# Patient Record
Sex: MALE | Race: WHITE | URBAN - METROPOLITAN AREA
[De-identification: names, ages, dates, MRNs, and addresses within clinical notes are randomized per-mention and may not be internally consistent; named-entity substitution may affect disease eponyms.]

---

## 2019-03-29 ENCOUNTER — HOSPITAL ENCOUNTER (OUTPATIENT)
Dept: OTHER | Facility: HOSPITAL | Age: 59
Discharge: HOME | End: 2019-03-29
Attending: FAMILY MEDICINE

## 2019-03-29 ENCOUNTER — OFFICE VISIT CONVERTED (OUTPATIENT)
Dept: FAMILY MEDICINE CLINIC | Age: 59
End: 2019-03-29
Attending: FAMILY MEDICINE

## 2019-03-29 LAB
ALBUMIN SERPL-MCNC: 3.5 G/DL (ref 3.5–5)
ALBUMIN/GLOB SERPL: 1 {RATIO} (ref 1.4–2.6)
ALT SERPL-CCNC: 12 U/L (ref 10–40)
ANION GAP SERPL CALC-SCNC: 15 MMOL/L (ref 8–19)
AST SERPL-CCNC: 12 U/L (ref 15–50)
BASOPHILS # BLD AUTO: 0.05 10*3/UL (ref 0–0.2)
BASOPHILS NFR BLD AUTO: 0.7 % (ref 0–3)
BILIRUB SERPL-MCNC: 0.28 MG/DL (ref 0.2–1.3)
BUN SERPL-MCNC: 12 MG/DL (ref 5–25)
BUN/CREAT SERPL: 12 {RATIO} (ref 6–20)
CHLORIDE SERPL-SCNC: 104 MMOL/L (ref 99–111)
CHOLEST SERPL-MCNC: 160 MG/DL (ref 107–200)
CHOLEST/HDLC SERPL: 5.9 {RATIO} (ref 3–6)
CONV ABS IMM GRAN: 0.02 10*3/UL (ref 0–0.2)
CONV CO2: 25 MMOL/L (ref 22–32)
CONV IMMATURE GRAN: 0.3 % (ref 0–1.8)
CONV TOTAL PROTEIN: 7 G/DL (ref 6.3–8.2)
CREAT UR-MCNC: 1 MG/DL (ref 0.7–1.2)
DEPRECATED RDW RBC AUTO: 41.8 FL (ref 35.1–43.9)
EOSINOPHIL # BLD AUTO: 0.16 10*3/UL (ref 0–0.7)
EOSINOPHIL # BLD AUTO: 2.3 % (ref 0–7)
ERYTHROCYTE [DISTWIDTH] IN BLOOD BY AUTOMATED COUNT: 12.8 % (ref 11.6–14.4)
GFR SERPLBLD BASED ON 1.73 SQ M-ARVRAT: >60 ML/MIN/{1.73_M2}
GLOBULIN UR ELPH-MCNC: 3.5 G/DL (ref 2–3.5)
GLUCOSE SERPL-MCNC: 117 MG/DL (ref 70–99)
HBA1C MFR BLD: 14.7 G/DL (ref 14–18)
HCT VFR BLD AUTO: 44.6 % (ref 42–52)
HDLC SERPL-MCNC: 27 MG/DL (ref 40–60)
LDLC SERPL CALC-MCNC: 85 MG/DL (ref 70–100)
LYMPHOCYTES # BLD AUTO: 0.81 10*3/UL (ref 1–5)
MCH RBC QN AUTO: 29.6 PG (ref 27–31)
MCHC RBC AUTO-ENTMCNC: 33 G/DL (ref 33–37)
MCV RBC AUTO: 89.7 FL (ref 80–96)
MONOCYTES # BLD AUTO: 0.45 10*3/UL (ref 0.2–1.2)
MONOCYTES NFR BLD AUTO: 6.4 % (ref 3–10)
NEUTROPHILS # BLD AUTO: 5.58 10*3/UL (ref 2–8)
NEUTROPHILS NFR BLD AUTO: 78.8 % (ref 30–85)
NRBC CBCN: 0 % (ref 0–0.7)
OSMOLALITY SERPL CALC.SUM OF ELEC: 291 MOSM/KG (ref 273–304)
PLATELET # BLD AUTO: 272 10*3/UL (ref 130–400)
PMV BLD AUTO: 9.1 FL (ref 9.4–12.4)
POTASSIUM SERPL-SCNC: 3.8 MMOL/L (ref 3.5–5.3)
RBC # BLD AUTO: 4.97 10*6/UL (ref 4.7–6.1)
SODIUM SERPL-SCNC: 140 MMOL/L (ref 135–147)
TRIGL SERPL-MCNC: 241 MG/DL (ref 40–150)
TSH SERPL-ACNC: 0.61 M[IU]/L (ref 0.27–4.2)
VARIANT LYMPHS NFR BLD MANUAL: 11.5 % (ref 20–45)
VLDLC SERPL-MCNC: 48 MG/DL (ref 5–37)
WBC # BLD AUTO: 7.07 10*3/UL (ref 4.8–10.8)

## 2019-04-01 LAB
ALP SERPL-CCNC: 89 U/L (ref 56–119)
ASO AB SERPL-ACNC: 20 [IU]/ML (ref 0–200)
CALCIUM SERPL-MCNC: 9 MG/DL (ref 8.7–10.4)
CONV ANTI NUCLEAR ANTIBODY WITH REFLEX: NEGATIVE
CONV RHEUMATOID FACTOR IGM: 10.6 [IU]/ML (ref 0–14)
CRP SERPL-MCNC: 26.1 MG/L (ref 0–5)
ERYTHROCYTE [SEDIMENTATION RATE] IN BLOOD: 13 MM/H (ref 0–20)
PHOSPHATE SERPL-MCNC: 3.2 MG/DL (ref 2.4–4.5)
URATE SERPL-MCNC: 6.6 MG/DL (ref 3.5–8.5)

## 2019-04-05 ENCOUNTER — OFFICE VISIT CONVERTED (OUTPATIENT)
Dept: FAMILY MEDICINE CLINIC | Age: 59
End: 2019-04-05
Attending: FAMILY MEDICINE

## 2019-06-07 ENCOUNTER — OFFICE VISIT CONVERTED (OUTPATIENT)
Dept: FAMILY MEDICINE CLINIC | Age: 59
End: 2019-06-07
Attending: FAMILY MEDICINE

## 2019-11-22 ENCOUNTER — OFFICE VISIT CONVERTED (OUTPATIENT)
Dept: FAMILY MEDICINE CLINIC | Age: 59
End: 2019-11-22
Attending: FAMILY MEDICINE

## 2020-01-24 ENCOUNTER — OFFICE VISIT CONVERTED (OUTPATIENT)
Dept: NEUROSURGERY | Facility: CLINIC | Age: 60
End: 2020-01-24
Attending: PHYSICIAN ASSISTANT

## 2021-05-15 VITALS
HEIGHT: 72 IN | WEIGHT: 205.12 LBS | DIASTOLIC BLOOD PRESSURE: 80 MMHG | SYSTOLIC BLOOD PRESSURE: 133 MMHG | BODY MASS INDEX: 27.78 KG/M2

## 2021-05-18 NOTE — PROGRESS NOTES
Tyrone Painting 1960     Office/Outpatient Visit    Visit Date: Fri, Mar 29, 2019 04:17 pm    Provider: Jas Avina MD (Assistant: Chioma De Paz MA)    Location: [facility]        Electronically signed by Jas Avina MD on  03/29/2019 05:59:43 PM                             SUBJECTIVE:        CC:     Mr. Painting is a 59 year old male.  establish care; joint pain; sciatica         HPI: Does not like going to the doctor. No primary doctor since Sl[facility] Riley.         PHQ-9 Depression Screening: Completed form scanned and in chart; Total Score 14 Alcohol Consumption Screening: Completed form scanned and in chart; Total Score 1     Pt reports chronic low back pain. This limits him at work and hobbies.     BP today is elevated. Pt denies h/o HTN but does not go to the doctor much.     Spastic colon and irritable bowel syndrome. First colonoscopy was in his 30s. Last colonoscopy was about 5 years ago at [facility].     PHQ-9 score of 14 is positive for depression. He denies being frankly depressed but also says he doesn't really care if he is. His depression centers around his chronic pain, which prevents him from doing things like working on cars.     Pt reports diffuse muscle aches every where. If he sleeps for more than 5 hours he wakes up sore and stiff in all his joints.     ROS:     CONSTITUTIONAL:  Negative for fatigue and fever.      EYES:  Negative for blurred vision.      E/N/T:  Negative for diminished hearing and nasal congestion.      CARDIOVASCULAR:  Negative for chest pain and palpitations.      RESPIRATORY:  Negative for recent cough and dyspnea.      GASTROINTESTINAL:  Negative for abdominal pain, constipation, diarrhea, nausea and vomiting.      MUSCULOSKELETAL:  Positive for arthralgias, back pain and myalgias.      INTEGUMENTARY:  Negative for atypical mole(s) and rash.      NEUROLOGICAL:  Negative for paresthesias and weakness.      PSYCHIATRIC:  Positive for depression,  anhedonia and low motivation.   Negative for anxiety or sleep disturbance.          PMH/FMH/SH:     Last Reviewed on 3/29/2019 05:50 PM by Jas Avina    Past Medical History:             PAST MEDICAL HISTORY         Osteoarthritis     Irritable Bowel Syndrome     Chronic Pain     Depression         PREVENTIVE HEALTH MAINTENANCE             COLORECTAL CANCER SCREENING:; colonoscopy with normal results; 4-5 years ago - [facility]         Surgical History:         Hernia Repair: right inguinal;         Family History:     Father:  at age 91; Cause of death was colon cancer     Mother: Hypertension; Hyperlipidemia;  Type 1 Diabetes         Social History:     Occupation: [employer].   [employer]     Marital Status:      Children: 2 children         Tobacco/Alcohol/Supplements:     Last Reviewed on 3/29/2019 05:50 PM by Jas Avina    Tobacco: Current Smoker: He currently smokes every day, 1 pack per day.  Non-drinker     Caffeine:  He admits to consuming caffeine via soda ( -Mt Dew ).          Substance Abuse History:     Last Reviewed on 3/29/2019 05:50 PM by Jas Avina    None         Mental Health History:     Last Reviewed on 3/29/2019 05:50 PM by Jas Avina        Major Depression         Communicable Diseases (eg STDs):     Last Reviewed on 3/29/2019 05:50 PM by Jas Avina            Current Problems:     Last Reviewed on 3/29/2019 05:50 PM by Jas Avina    Diffuse arthralgia     Major depression, recurrent episode, moderate     Spastic colon     Chronic low back pain     Low back pain     Elevated blood pressure without a diagnosis of hypertension     Screening for depression         Immunizations:     None        Allergies:     Last Reviewed on 3/29/2019 05:50 PM by Jas Avina      No Known Drug Allergies.         Current Medications:     Last Reviewed on 3/29/2019 05:50 PM by Jas Avina    None        OBJECTIVE:        Vitals:         Current: 3/29/2019  4:25:22 PM    Ht:  5 ft, 11.5 in;  Wt: 195.6 lbs;  BMI: 26.9    T: 98.5 F (oral);  BP: 135/83 mm Hg (left arm, sitting);  P: 89 bpm (left arm (BP Cuff), sitting)        Exams:     PHYSICAL EXAM:     GENERAL: Vitals recorded well developed, well nourished;  well groomed;  no apparent distress;     EYES: extraocular movements intact; conjunctiva and cornea are normal; PERRLA;     E/N/T: OROPHARYNX:  normal mucosa, dentition, gingiva, and posterior pharynx;     RESPIRATORY: normal respiratory rate and pattern with no distress; normal breath sounds with no rales, rhonchi, wheezes or rubs;     CARDIOVASCULAR: normal rate; rhythm is regular;  no systolic murmur; no edema;     GASTROINTESTINAL: nontender; normal bowel sounds;     MUSCULOSKELETAL: normal gait; normal overall tone     NEUROLOGIC: mental status: alert and oriented x 3;     PSYCHIATRIC:  appropriate affect and demeanor; normal speech pattern; grossly normal memory;         ASSESSMENT           V79.0   Z13.89  Screening for depression              DDx:     724.2   F41.8  Chronic low back pain              DDx:     796.2   R03.0  Elevated blood pressure without a diagnosis of hypertension              DDx:     564.1   K58.9  Spastic colon              DDx:     296.32   F33.1  Major depression, recurrent episode, moderate              DDx:     719.49   M15.0  Diffuse arthralgia              DDx:         ORDERS:         Meds Prescribed:       Amitriptyline HCl 25mg Tablet 1 po QHS  #30 (Thirty) tablet(s) Refills: 1         Radiology/Test Orders:       [id number]  Radiologic examination, spine, lumbosacral;  minimum of four views  (Send-Out)           Lab Orders:       [id number]  [facility] PHYSICAL: CMP, CBC, TSH, LIPID: [id number], [id number], [id number], [id number]  (Send-Out)         37465  [facility] Arthritis Profile  (Send-Out)           Other Orders:         Depression screen positive and follow up plan documented  (In-House)         1101F  Pt screen for fall risk; document no  falls in past year or only 1 fall w/o injury in past year (ANABELLA)  (In-House)           Negative EtOH screen  (In-House)                   PLAN:          Screening for depression     MIPS PHQ-9 Depression Screening Completed form scanned and in chart; Total Score 14 Positive Depression Screen: Suicide Risk Assessment completed--denies suicidal/homicidal ideation; Pharmacologic intervention initiated/modified Negative alcohol screen           Orders:         Depression screen positive and follow up plan documented  (In-House)         1101F  Pt screen for fall risk; document no falls in past year or only 1 fall w/o injury in past year (ANABELLA)  (In-House)           Negative EtOH screen  (In-House)            Chronic low back pain x-ray ordered to assess chronic low back pain.         RADIOLOGY:  I have ordered Lumbar/Sacral Spine X-ray to be done today.            Orders:       [id number]  Radiologic examination, spine, lumbosacral;  minimum of four views  (Send-Out)            Elevated blood pressure without a diagnosis of hypertension Consider metoprolol.     LABORATORY:  Labs ordered to be performed today include PHYSICAL PANEL; CMP, CBC, TSH, LIPID.            Orders:       [id number]  [facility] PHYSICAL: CMP, CBC, TSH, LIPID: [id number], [id number], [id number], [id number]  (Send-Out)            Spastic colon Pt may very well have IBS, consider linzess, bentyl, hyociomine, or amitiza. Exercise and fiber as well.          Major depression, recurrent episode, moderate Pt meets criteria for depression. This seems to stem from his chronic pain consisting of back pain and diffuse arthralgia. We considered sertraline for depression but decided on amitryptiline which may help with potential fibromyalgia picture, sleep, and depression.           Prescriptions:       Amitriptyline HCl 25mg Tablet 1 po QHS  #30 (Thirty) tablet(s) Refills: 1          Diffuse arthralgia Arthritis panel ordered to asses for potential autimmune conditions such  as RA.     LABORATORY:  Labs ordered to be performed today include Arthritis Profile.            Orders:       [id number]  [facility] Arthritis Profile  (Send-Out)               Patient Recommendations:        For  Diffuse arthralgia:     I also recommend ^.              CHARGE CAPTURE           **Please note: ICD descriptions below are intended for billing purposes only and may not represent clinical diagnoses**        Primary Diagnosis:         V79.0 Screening for depression            Z13.89    Encounter for screening for other disorder              Orders:          40030   Office visit - new pt, level 3  (In-House)                Depression screen positive and follow up plan documented  (In-House)             1101F   Pt screen for fall risk; document no falls in past year or only 1 fall w/o injury in past year (ANABELLA)  (In-House)                Negative EtOH screen  (In-House)           724.2 Chronic low back pain            F41.8    Other specified anxiety disorders    796.2 Elevated blood pressure without a diagnosis of hypertension            R03.0    Elevated blood-pressure reading, without diagnosis of hypertension    564.1 Spastic colon            K58.9    Irritable bowel syndrome without diarrhea    296.32 Major depression, recurrent episode, moderate            F33.1    Major depressive disorder, recurrent, moderate    719.49 Diffuse arthralgia            M15.0    Primary generalized (osteo)arthritis

## 2021-05-18 NOTE — PROGRESS NOTES
Tyrone Painting 1960     Office/Outpatient Visit    Visit Date: Fri, Jun 7, 2019 04:47 pm    Provider: Jas Avina MD (Assistant: Abhinav Barnett)    Location: [facility]        Electronically signed by Jas Avina MD on  06/08/2019 08:42:14 AM                             SUBJECTIVE:        CC:     Mr. Painting is a 59 year old male.  This is a follow-up visit.          HPI:     Pt reports he continues to have joint and muscle aches involving almost his whole body. When asked to specify which joints and/or muscles hurt the most he has trouble identifying which but says his lower back, shoulders, knees, thighs, biceps, etc all ache. He also reports not having near as much energy or stamina as he once did. No improvement with amitriptyline or flexeril.     BP is normal today. I prescribed him lisinopril 10 mg  qd at last visit 2 months ago. He reports he has not been taking this medicine although BP is normal today. He checked his BP at work and it was normal then.     Left knee swelling for the last month. He reports its painful, worse with squatting, worse with crossing his legs, going up and down steps.     Pt reports he feels pretty much the same today as he did at his previous visits. He denies ino depression, reports just feeling more down and fatigued than anything. No improvement with amitriptyline.     ROS:     CONSTITUTIONAL:  Positive for fatigue.   Negative for fever.      E/N/T:  Negative for diminished hearing and nasal congestion.      CARDIOVASCULAR:  Negative for chest pain and palpitations.      RESPIRATORY:  Negative for recent cough and dyspnea.      GASTROINTESTINAL:  Negative for abdominal pain, constipation, diarrhea, nausea and vomiting.      MUSCULOSKELETAL:  Positive for arthralgias, back pain and myalgias.      INTEGUMENTARY:  Negative for atypical mole(s) and rash.      NEUROLOGICAL:  Negative for paresthesias and weakness.      PSYCHIATRIC:  Positive for  anhedonia, insomnia and low motivation.   Negative for anxiety, depression or sadness.          PMH/FMH/SH:     Last Reviewed on 2019 08:27 AM by Jas Avina    Past Medical History:             PAST MEDICAL HISTORY         Osteoarthritis     Irritable Bowel Syndrome     Chronic Pain     Depression         PREVENTIVE HEALTH MAINTENANCE             COLORECTAL CANCER SCREENING:; colonoscopy with normal results; 4-5 years ago - [facility]         Surgical History:         Hernia Repair: right inguinal;         Family History:     Father:  at age 91; Cause of death was colon cancer     Mother: Hypertension; Hyperlipidemia;  Type 1 Diabetes         Social History:     Occupation: [employer].   [employer]     Marital Status:      Children: 2 children         Tobacco/Alcohol/Supplements:     Last Reviewed on 2019 08:27 AM by Jas Avina    Tobacco: Current Smoker: He currently smokes every day, 1 pack per day.  Non-drinker     Caffeine:  He admits to consuming caffeine via soda ( -Mt Dew ).          Substance Abuse History:     Last Reviewed on 2019 08:27 AM by Jas Avina    None         Mental Health History:     Last Reviewed on 2019 08:27 AM by Jas Avina        Major Depression         Communicable Diseases (eg STDs):     Last Reviewed on 2019 08:27 AM by Jas Avina            Current Problems:     Last Reviewed on 2019 08:27 AM by Jas Avina    Benign HTN     Diffuse arthralgia     Major depression, recurrent episode, moderate     Spastic colon     Chronic low back pain     Low back pain     Knee swelling     Screening for depression         Immunizations:     None        Allergies:     Last Reviewed on 2019 08:27 AM by Jas Avina      No Known Drug Allergies.         Current Medications:     Last Reviewed on 2019 08:27 AM by Jas Avina    Lisinopril 10mg Tablet 1 tab daily         OBJECTIVE:        Vitals:         Current:  6/7/2019 4:51:57 PM    Ht:  5 ft, 11.5 in;  Wt: 195.6 lbs;  BMI: 26.9    T: 98.4 F (oral);  BP: 129/78 mm Hg (left arm, sitting);  P: 73 bpm (left arm (BP Cuff), sitting);  sCr: 1 mg/dL;  GFR: 81.08        Exams:     PHYSICAL EXAM:     GENERAL: Vitals recorded well developed, well nourished;  well groomed;  no apparent distress, tired-appearing;     EYES: extraocular movements intact; conjunctiva and cornea are normal; PERRLA;     E/N/T: OROPHARYNX:  normal mucosa, dentition, gingiva, and posterior pharynx;     RESPIRATORY: normal respiratory rate and pattern with no distress; normal breath sounds with no rales, rhonchi, wheezes or rubs;     CARDIOVASCULAR: normal rate; rhythm is regular;  no systolic murmur; no edema;     GASTROINTESTINAL: nontender; normal bowel sounds;     MUSCULOSKELETAL: normal gait; normal overall tone     NEUROLOGIC: mental status: alert and oriented x 3;     PSYCHIATRIC: affect/demeanor: depressed, flat, slightly less flat affect than on previous visits;  normal psychomotor function; normal speech pattern; normal thought and perception;         ASSESSMENT           719.49   M15.0  Diffuse arthralgia              DDx:     401.1   I10  Benign HTN              DDx:     719.06   M25.462  Knee swelling              DDx:     296.32   F33.1  Major depression, recurrent episode, moderate              DDx:         ORDERS:         Meds Prescribed:       Sertraline HCl 50mg Tablet 1/2 tab for 2 weeks, then 1 tab qd thereafter.  #30 (Thirty) tablet(s) Refills: 0         Lab Orders:       [id number]  [facility] C-reactive protein CRP  (Send-Out)         FUTURE  Future order to be done at patients convenience  (Send-Out)         [id number]  [facility] PHYSICAL: CMP, CBC, TSH, LIPID: [id number], [id number], [id number], [id number]  (Send-Out)           Procedures Ordered:       REFER  Referral to Specialist or Other Facility  (Send-Out)                   PLAN:          Diffuse arthralgia We discussed that diffuse arthalgias and  myalgias and decreased energy may simply be a consequence of his age. Pt compares himself to when he was 25 years old and this may not be a realistic expectation. Pt does have an elevated CRP on previous labs and we will repeat this lab. Arthritis panel was otherwise normal.     LABORATORY:  Labs ordered to be performed today include CRP, Quantitative.      REFERRALS:  Referral initiated to a rheumatologist ( any rheumatologist other than Ninfa Rasheed ).            Orders:       [id number]  [facility] C-reactive protein CRP  (Send-Out)         REFER  Referral to Specialist or Other Facility  (Send-Out)            Benign HTN BP normal today. I am not 100% sure he hasn't been taking lisinopril for the last 2 months as he's not sure which medicines are given for which problems but since he reports not wanting to be on BP meds will stop lisinopril for now. If BP elevated at next visit we will discuss restarting.         FOLLOW-UP TESTING #1: FOLLOW-UP LABORATORY:  Labs to be scheduled in the future include PHYSICAL PANEL; CMP, CBC, TSH, LIPID.            Orders:       FUTURE  Future order to be done at patients convenience  (Send-Out)         44019  [facility] PHYSICAL: CMP, CBC, TSH, LIPID: [id number], [id number], [id number], [id number]  (Send-Out)            Knee swelling Knee swelling and pain are improving. Etiology is unclear but since Sx are improving and pt does not really want x-ray we will monitor and investigate further if swelling and pain worsen or return.          Major depression, recurrent episode, moderate No improvement with amitriptyline 25 or 50 mg. We will try sertraline to see if an SSRI can help with mood and possibly muscle and joint aches if there is a depression/fibromyalgia component.           Prescriptions:       Sertraline HCl 50mg Tablet 1/2 tab for 2 weeks, then 1 tab qd thereafter.  #30 (Thirty) tablet(s) Refills: 0             Patient Recommendations:        For  Benign HTN:             The following  laboratory testing has been ordered:             CHARGE CAPTURE           **Please note: ICD descriptions below are intended for billing purposes only and may not represent clinical diagnoses**        Primary Diagnosis:         719.49 Diffuse arthralgia            M15.0    Primary generalized (osteo)arthritis              Orders:          [id number]   Office/outpatient visit; established patient, level 4  (In-House)           401.1 Benign HTN            I10    Essential (primary) hypertension    719.06 Knee swelling            M25.462    Effusion, left knee    296.32 Major depression, recurrent episode, moderate            F33.1    Major depressive disorder, recurrent, moderate

## 2021-05-18 NOTE — PROGRESS NOTES
Tyrone Painting 1960     Office/Outpatient Visit    Visit Date: Fri, Apr 5, 2019 04:02 pm    Provider: Jas Avina MD (Assistant: Sarah Spurling, MA)    Location: [facility]        Electronically signed by Jas Avina MD on  04/13/2019 04:39:09 PM                             SUBJECTIVE:        CC:     Mr. Painting is a 59 year old male.  This is a follow-up visit.          HPI:     Pt doesn't consider himself depressed, just feels worn out all the time. Doesn't really feel down or sad. 1 week ago he was started on amitriptine for sleep, depression, and diffuse pain and he does not feel any difference.     Labs last week were overall normal, as was lumbar x-ray. CRP was elevated but sed rate was normal. Pain is worst in low back, goes up his back, sharp pain down his arm. MRI shoulder was normal despite severe pain. He is still very active. He's done physical therapy for lower back, maybe other joints or his leg. he did get some relief from a TENS unit except when it was turned up too high. Pt gets up early, drives to [place],     BP elevated today and at previous visit.     Pt smoked 1 PPD and is interested in quitting. He'd like to try chantix.     ROS:     CONSTITUTIONAL:  Positive for fatigue.   Negative for fever.      EYES:  Negative for blurred vision.      E/N/T:  Negative for diminished hearing and nasal congestion.      CARDIOVASCULAR:  Negative for chest pain and palpitations.      RESPIRATORY:  Negative for recent cough and dyspnea.      GASTROINTESTINAL:  Negative for abdominal pain, constipation, diarrhea, nausea and vomiting.      MUSCULOSKELETAL:  Positive for arthralgias, back pain and myalgias.      INTEGUMENTARY:  Negative for atypical mole(s) and rash.      NEUROLOGICAL:  Negative for paresthesias and weakness.      PSYCHIATRIC:  Positive for depression, anhedonia, insomnia and low motivation.   Negative for anxiety.          PMH/FMH/SH:     Last Reviewed on 3/29/2019  05:50 PM by Jas Avina    Past Medical History:             PAST MEDICAL HISTORY         Osteoarthritis     Irritable Bowel Syndrome     Chronic Pain     Depression         PREVENTIVE HEALTH MAINTENANCE             COLORECTAL CANCER SCREENING:; colonoscopy with normal results; 4-5 years ago - [facility]         Surgical History:         Hernia Repair: right inguinal;         Family History:     Father:  at age 91; Cause of death was colon cancer     Mother: Hypertension; Hyperlipidemia;  Type 1 Diabetes         Social History:     Occupation: .   [employer]     Marital Status:      Children: 2 children         Tobacco/Alcohol/Supplements:     Last Reviewed on 3/29/2019 05:50 PM by Jas Avina    Tobacco: Current Smoker: He currently smokes every day, 1 pack per day.  Non-drinker     Caffeine:  He admits to consuming caffeine via soda ( -Mt Dew ).          Substance Abuse History:     Last Reviewed on 3/29/2019 05:50 PM by Jas Avina    None         Mental Health History:     Last Reviewed on 3/29/2019 05:50 PM by Jas Avina        Major Depression         Communicable Diseases (eg STDs):     Last Reviewed on 3/29/2019 05:50 PM by Jas Avina            Current Problems:     Last Reviewed on 3/29/2019 05:50 PM by Jas Avina    Diffuse arthralgia     Major depression, recurrent episode, moderate     Spastic colon     Chronic low back pain     Low back pain     Elevated blood pressure without a diagnosis of hypertension     Screening for depression         Immunizations:     None        Allergies:     Last Reviewed on 3/29/2019 05:50 PM by Jas Avina      No Known Drug Allergies.         Current Medications:     Last Reviewed on 3/29/2019 05:50 PM by Jas Avina    Amitriptyline HCl 25mg Tablet 1 po QHS         OBJECTIVE:        Vitals:         Current: 2019 4:09:31 PM    Ht:  5 ft, 11.5 in;  Wt: 202 lbs;  BMI: 27.8    T: 98.4 F (oral);  BP: 144/86 mm  Hg (left arm, sitting);  P: 75 bpm (left arm (BP Cuff), sitting);  sCr: 1 mg/dL;  GFR: 82.19        Exams:     PHYSICAL EXAM:     GENERAL: Vitals recorded well developed, well nourished;  well groomed;  no apparent distress, tired-appearing;     EYES: extraocular movements intact; conjunctiva and cornea are normal; PERRLA;     E/N/T: OROPHARYNX:  normal mucosa, dentition, gingiva, and posterior pharynx;     RESPIRATORY: normal respiratory rate and pattern with no distress; normal breath sounds with no rales, rhonchi, wheezes or rubs;     CARDIOVASCULAR: normal rate; rhythm is regular;  no systolic murmur; no edema;     GASTROINTESTINAL: nontender; normal bowel sounds;     MUSCULOSKELETAL: normal gait; normal overall tone     NEUROLOGIC: mental status: alert and oriented x 3;     PSYCHIATRIC: affect/demeanor: depressed, flat;  normal psychomotor function; normal speech pattern; normal thought and perception;         ASSESSMENT           296.32   F33.1  Major depression, recurrent episode, moderate              DDx:     719.49   M15.0  Diffuse arthralgia              DDx:     401.1   I10  Benign HTN              DDx:     305.1   F17.210  Tobacco abuse              DDx:         ORDERS:         Meds Prescribed:       Refill of: Amitriptyline HCl 50mg Tablet 1 tab daily HS  #30 (Thirty) tablet(s) Refills: 1       Baclofen 10mg Tablet 1 tab po TID prn for pain/muscle pain  #60 (Sixty) tablet(s) Refills: 1       Chantix Starting Month Box (Varenicline Tartrate) 0.5mg/1mg Tablet Start 0.5mg daily x 3 days, then 0.5mg twice daily x 4 days, Max 2mg/day. Take with food, start drug 1 week before quit date  #53 (Fifty Three) tablet(s) Refills: 1       Lisinopril 10mg Tablet 1 tab daily  #30 (Thirty) tablet(s) Refills: 2                 PLAN:          Major depression, recurrent episode, moderate Amitriptyline increased from 25 to 50 mg qHS as pt had minimal effect (although no adverse effects) at lower dose.            Prescriptions:       Refill of: Amitriptyline HCl 50mg Tablet 1 tab daily HS  #30 (Thirty) tablet(s) Refills: 1          Diffuse arthralgia Still unclear etiology of diffuse arthraligias, possibly fibromyagia. CRP is elevated but etiology is unclear.           Prescriptions:       Baclofen 10mg Tablet 1 tab po TID prn for pain/muscle pain  #60 (Sixty) tablet(s) Refills: 1          Benign HTN Lisinopril 10 mg qd started for HTN.           Prescriptions:       Lisinopril 10mg Tablet 1 tab daily  #30 (Thirty) tablet(s) Refills: 2          Tobacco abuse           Prescriptions:       Chantix Starting Month Box (Varenicline Tartrate) 0.5mg/1mg Tablet Start 0.5mg daily x 3 days, then 0.5mg twice daily x 4 days, Max 2mg/day. Take with food, start drug 1 week before quit date  #53 (Fifty Three) tablet(s) Refills: 1             CHARGE CAPTURE           **Please note: ICD descriptions below are intended for billing purposes only and may not represent clinical diagnoses**        Primary Diagnosis:         296.32 Major depression, recurrent episode, moderate            F33.1    Major depressive disorder, recurrent, moderate              Orders:          67419   Office/outpatient visit; established patient, level 4  (In-House)           719.49 Diffuse arthralgia            M15.0    Primary generalized (osteo)arthritis    401.1 Benign HTN            I10    Essential (primary) hypertension    305.1 Tobacco abuse            F17.210    Nicotine dependence, cigarettes, uncomplicated

## 2021-07-01 VITALS
BODY MASS INDEX: 28.04 KG/M2 | TEMPERATURE: 98.5 F | HEIGHT: 72 IN | HEART RATE: 87 BPM | SYSTOLIC BLOOD PRESSURE: 155 MMHG | DIASTOLIC BLOOD PRESSURE: 88 MMHG | WEIGHT: 207 LBS

## 2021-07-01 VITALS
DIASTOLIC BLOOD PRESSURE: 86 MMHG | SYSTOLIC BLOOD PRESSURE: 144 MMHG | TEMPERATURE: 98.4 F | HEIGHT: 72 IN | HEART RATE: 75 BPM | WEIGHT: 202 LBS | BODY MASS INDEX: 27.36 KG/M2

## 2021-07-01 VITALS
WEIGHT: 195.6 LBS | TEMPERATURE: 98.4 F | DIASTOLIC BLOOD PRESSURE: 78 MMHG | BODY MASS INDEX: 26.49 KG/M2 | HEIGHT: 72 IN | SYSTOLIC BLOOD PRESSURE: 129 MMHG | HEART RATE: 73 BPM

## 2021-07-01 VITALS
BODY MASS INDEX: 26.49 KG/M2 | DIASTOLIC BLOOD PRESSURE: 83 MMHG | HEIGHT: 72 IN | TEMPERATURE: 98.5 F | HEART RATE: 89 BPM | SYSTOLIC BLOOD PRESSURE: 135 MMHG | WEIGHT: 195.6 LBS

## 2022-05-23 NOTE — PROGRESS NOTES
"Tyrone Painting  1960     Office/Outpatient Visit    Visit Date: Fri, Nov 22, 2019 03:34 pm    Provider: Jas Avina MD (Assistant: Shira Adams MA)    Location: [facility]        Electronically signed by Jas Avina MD on  11/23/2019 09:02:25 AM                             Subjective:        CC: Mr. Painting is a 59 year old White male.  This is a follow-up visit.  check up         HPI:       BP today is 155/88 with a HR of 87. He was previously prescribed lisinopril but declined to take this. Previous BP here has been just slightly elevated. He reports taking BP at home and its typically 120/70. No headache or blurry vision.      Pt reports he feels pretty much the same today as he did at his previous visits. He says he feels \"normal\", not necessarily depressed but he does get aggravated easily. He does enjoy spending time with his wife. He feels very fatigued and hurting all over. No improvement with amitriptyline. He did not have any improvement with zoloft either.      Pt continues to have pain in lower back, shoulder that radiates down both arms, hips hurt with pain radiating down both legs, and mid-back. No improvement with amitriptyline or flexeril. Pt completed PT several years ago with no benefit. Pt saw [facility] on 8/2/19 and Dr. Figueredo did not think pt's pain was autoimmune, fibroyalgia, or from a systemic inflammatory condition. He believes it is more OA and best managed with epidural injections by pain management.    ROS:     CONSTITUTIONAL:  Positive for fatigue.   Negative for fever.      E/N/T:  Negative for diminished hearing and nasal congestion.      CARDIOVASCULAR:  Negative for chest pain and palpitations.      RESPIRATORY:  Negative for recent cough and dyspnea.      GASTROINTESTINAL:  Negative for abdominal pain, constipation, diarrhea, nausea and vomiting.      MUSCULOSKELETAL:  Positive for arthralgias, back pain and myalgias.      INTEGUMENTARY:  Negative " TRANSFER - OUT REPORT:    Verbal report given to DANNA Petty RN(name) on Ganesh Chu  being transferred to [facility](unit[facility] for routine post - op       Report consisted of patients Situation, Background, Assessment and   Recommendations(SBAR). Information from the following report(s) SBAR, OR Summary, Procedure Summary, Intake/Output, MAR, Pre Procedure Checklist and Quality Measures was reviewed with the receiving nurse. Lines:   Peripheral IV 05/23/22 Right Hand (Active)   Site Assessment Clean, dry, & intact 05/23/22 1540   Phlebitis Assessment 0 05/23/22 1540   Infiltration Assessment 0 05/23/22 1540   Dressing Status Clean, dry, & intact 05/23/22 1540   Dressing Type Transparent 05/23/22 1540   Hub Color/Line Status Pink;Patent 05/23/22 1540   Alcohol Cap Used No 05/23/22 1540        Opportunity for questions and clarification was provided.       Patient transported with:   Registered Nurse   Visit Vitals  /71 (BP 1 Location: Left upper arm, BP Patient Position: At rest;Reclining)   Pulse 78   Temp 97.3 °F (36.3 °C)   Resp 16   Ht 6' 2\" (1.88 m)   Wt 100.2 kg (221 lb)   SpO2 96%   BMI 28.37 kg/m² for atypical mole(s) and rash.      NEUROLOGICAL:  Negative for paresthesias and weakness.      PSYCHIATRIC:  Positive for anhedonia, insomnia and low motivation.   Negative for anxiety, depression or sadness.          Past Medical History / Family History / Social History:         Last Reviewed on 2019 04:29 PM by Jas Avina    Past Medical History:             PAST MEDICAL HISTORY         Osteoarthritis     Irritable Bowel Syndrome     Chronic Pain     Depression         PREVENTIVE HEALTH MAINTENANCE             COLORECTAL CANCER SCREENING:; colonoscopy with normal results; 4-5 years ago - [facility]         Surgical History:         Hernia Repair: right inguinal;         Family History:     Father:  at age 91; Cause of death was colon cancer     Mother: Hypertension; Hyperlipidemia;  Type 1 Diabetes         Social History:     Occupation: [email or web address]   [employer]     Marital Status:      Children: 2 children         Tobacco/Alcohol/Supplements:     Last Reviewed on 2019 04:29 PM by Jas Avina    Tobacco: Current Smoker: He currently smokes every day, 1 pack per day.  Non-drinker     Caffeine:  He admits to consuming caffeine via soda ( -Mt Dew ).          Substance Abuse History:     Last Reviewed on 2019 04:29 PM by Jas Avina    None         Mental Health History:     Last Reviewed on 2019 04:29 PM by Jas Avina        Major Depression         Communicable Diseases (eg STDs):     Last Reviewed on 2019 04:29 PM by Jas Avina        Current Problems:     Last Reviewed on 2019 04:29 PM by Jas Avina    Major depressive disorder, recurrent, moderate    Other specified anxiety disorders    Irritable bowel syndrome without diarrhea    Primary generalized (osteo)arthritis    Encounter for screening for other disorder    Essential (primary) hypertension    Polyarthritis, unspecified        Immunizations:     None        Allergies:     Last  Reviewed on 11/22/2019 04:29 PM by Jas Avina    No Known Allergies.        Current Medications:     Last Reviewed on 11/22/2019 04:29 PM by Jas Avina    None        Objective:        Vitals:         Current: 11/22/2019 3:38:19 PM    Ht:  5 ft, 11.5 in;  Wt: 207 lbs;  BMI: 28.5T: 98.5 F (oral);  BP: 155/88 mm Hg (right arm, sitting);  P: 87 bpm (right arm (BP Cuff), sitting);  sCr: 1 mg/dL;  GFR: 83.05        Exams:     PHYSICAL EXAM:     GENERAL: Vitals recorded well developed, well nourished;  well groomed;  no apparent distress, tired-appearing;     EYES: extraocular movements intact; conjunctiva and cornea are normal; PERRLA;     E/N/T: OROPHARYNX:  normal mucosa, dentition, gingiva, and posterior pharynx;     RESPIRATORY: normal respiratory rate and pattern with no distress; normal breath sounds with no rales, rhonchi, wheezes or rubs;     CARDIOVASCULAR: normal rate; rhythm is regular;  no systolic murmur; no edema;     GASTROINTESTINAL: nontender; normal bowel sounds;     MUSCULOSKELETAL: normal gait; normal overall tone lower back is diffusely TTP;     NEUROLOGIC: mental status: alert and oriented x 3;     PSYCHIATRIC: affect/demeanor: depressed, flat;  psychomotor: displays psychomotor retardation;  normal speech pattern; normal thought and perception;         Assessment:         I10   Essential (primary) hypertension       F33.1   Major depressive disorder, recurrent, moderate       M13.0   Polyarthritis, unspecified           ORDERS:         Meds Prescribed:       [New Rx] lisinopril 10 mg oral tablet [take 1 tablet (10 mg) by oral route once daily], #90 (ninety) tablets, Refills: 1 (one)         Radiology/Test Orders:       [id number]  Magnetic resonance imaging, spinal canal and contents, lumbar; without contrast  (Send-Out)              Lab Orders:       [id number]  [facility] PHYSICAL: CMP, CBC, TSH, LIPID: [id number], [id number], [id number]  (Send-Out)            49929  [facility] - [facility] Arthritis Profile   (Send-Out)              Procedures Ordered:       REFER  Referral to Specialist or Other Facility  (Send-Out)                      Plan:         Essential (primary) hypertensionBP is again elevated so lisinopril 10 mg qd is again sent to pharmacy.     LABORATORY:  Labs ordered to be performed today include PHYSICAL PANEL; CMP, CBC, TSH, LIPID.            Prescriptions:       [New Rx] lisinopril 10 mg oral tablet [take 1 tablet (10 mg) by oral route once daily], #90 (ninety) tablets, Refills: 1 (one)           Orders:       [id number]  [facility] PHYSICAL: CMP, CBC, TSH, LIPID: [id number], [id number]  (Send-Out)              Major depressive disorder, recurrent, moderatePt remains depressed with no help from several anti-depressants. Will focus more on low back pain at this time.         Polyarthritis, unspecifiedPt has pain in several joints, with worst pain in lower back. MRI lumbar spine is ordered and pt is advised insurance may not approve this since he has not completed PT. Pt additionally referred to [facility] in conjunction with recommendation that epidural injections may help for osteoarthritis.     LABORATORY:  Labs ordered to be performed today include Arthritis Profile.      RADIOLOGY:  I have ordered MRI Lumbar Spine w/o contrast to be done today.      REFERRALS:  Referral initiated to a chronic pain specialist ( [facility] ).            Orders:       [id number]  [facility] Arthritis Profile  (Send-Out)            [id number]  Magnetic resonance imaging, spinal canal and contents, lumbar; without contrast  (Send-Out)            REFER  Referral to Specialist or Other Facility  (Send-Out)                  Patient Recommendations:        For  Polyarthritis, unspecified:    I also recommend ^.              Charge Capture:         Primary Diagnosis:     I10  Essential (primary) hypertension           Orders:      [id number]  Office/outpatient visit; established patient, level 4  (In-House)               F33.1  Major depressive disorder, recurrent, moderate     M13.0  Polyarthritis, unspecified

## 2024-09-20 ENCOUNTER — LAB (OUTPATIENT)
Dept: LAB | Facility: HOSPITAL | Age: 64
End: 2024-09-20
Payer: COMMERCIAL

## 2024-09-20 ENCOUNTER — OFFICE VISIT (OUTPATIENT)
Dept: FAMILY MEDICINE CLINIC | Age: 64
End: 2024-09-20
Payer: COMMERCIAL

## 2024-09-20 VITALS
OXYGEN SATURATION: 98 % | BODY MASS INDEX: 27.36 KG/M2 | HEIGHT: 72 IN | DIASTOLIC BLOOD PRESSURE: 85 MMHG | HEART RATE: 77 BPM | WEIGHT: 202 LBS | SYSTOLIC BLOOD PRESSURE: 153 MMHG

## 2024-09-20 DIAGNOSIS — Z11.59 NEED FOR HEPATITIS C SCREENING TEST: ICD-10-CM

## 2024-09-20 DIAGNOSIS — Z13.220 SCREENING, LIPID: ICD-10-CM

## 2024-09-20 DIAGNOSIS — Z13.1 SCREENING FOR DIABETES MELLITUS: ICD-10-CM

## 2024-09-20 DIAGNOSIS — M47.816 LUMBAR SPONDYLOSIS: ICD-10-CM

## 2024-09-20 DIAGNOSIS — I10 PRIMARY HYPERTENSION: ICD-10-CM

## 2024-09-20 DIAGNOSIS — Z12.2 SCREENING FOR LUNG CANCER: ICD-10-CM

## 2024-09-20 DIAGNOSIS — Z12.5 SCREENING FOR PROSTATE CANCER: ICD-10-CM

## 2024-09-20 DIAGNOSIS — I10 PRIMARY HYPERTENSION: Primary | Chronic | ICD-10-CM

## 2024-09-20 DIAGNOSIS — Z87.891 PERSONAL HISTORY OF TOBACCO USE, PRESENTING HAZARDS TO HEALTH: ICD-10-CM

## 2024-09-20 PROBLEM — M15.9 GENERALIZED OA: Chronic | Status: ACTIVE | Noted: 2024-09-20

## 2024-09-20 PROBLEM — F32.A DEPRESSION: Status: ACTIVE | Noted: 2024-09-20

## 2024-09-20 PROBLEM — M54.2 NECK PAIN: Status: ACTIVE | Noted: 2024-09-20

## 2024-09-20 PROBLEM — K58.9 COLON SPASM: Chronic | Status: ACTIVE | Noted: 2024-09-20

## 2024-09-20 PROBLEM — F17.200 TOBACCO USE DISORDER: Chronic | Status: ACTIVE | Noted: 2024-09-20

## 2024-09-20 PROBLEM — F11.90 CHRONIC, CONTINUOUS USE OF OPIOIDS: Status: ACTIVE | Noted: 2022-12-12

## 2024-09-20 PROBLEM — M54.50 CHRONIC MIDLINE LOW BACK PAIN WITHOUT SCIATICA: Status: ACTIVE | Noted: 2022-12-12

## 2024-09-20 PROBLEM — M51.26 HERNIATED INTERVERTEBRAL DISC OF LUMBAR SPINE: Chronic | Status: ACTIVE | Noted: 2024-09-20

## 2024-09-20 PROBLEM — G89.29 CHRONIC MIDLINE LOW BACK PAIN WITHOUT SCIATICA: Status: ACTIVE | Noted: 2022-12-12

## 2024-09-20 PROBLEM — M19.90 ARTHRITIS: Status: ACTIVE | Noted: 2024-09-20

## 2024-09-20 PROBLEM — F17.210 CIGARETTE NICOTINE DEPENDENCE WITHOUT COMPLICATION: Status: ACTIVE | Noted: 2023-03-28

## 2024-09-20 LAB
ALBUMIN SERPL-MCNC: 4.2 G/DL (ref 3.5–5.2)
ALBUMIN/GLOB SERPL: 1.4 G/DL
ALP SERPL-CCNC: 89 U/L (ref 39–117)
ALT SERPL W P-5'-P-CCNC: 15 U/L (ref 1–41)
ANION GAP SERPL CALCULATED.3IONS-SCNC: 8.5 MMOL/L (ref 5–15)
AST SERPL-CCNC: 14 U/L (ref 1–40)
BASOPHILS # BLD AUTO: 0.08 10*3/MM3 (ref 0–0.2)
BASOPHILS NFR BLD AUTO: 1 % (ref 0–1.5)
BILIRUB SERPL-MCNC: 0.3 MG/DL (ref 0–1.2)
BUN SERPL-MCNC: 13 MG/DL (ref 8–23)
BUN/CREAT SERPL: 11.8 (ref 7–25)
CALCIUM SPEC-SCNC: 9.4 MG/DL (ref 8.6–10.5)
CHLORIDE SERPL-SCNC: 106 MMOL/L (ref 98–107)
CHOLEST SERPL-MCNC: 172 MG/DL (ref 0–200)
CO2 SERPL-SCNC: 26.5 MMOL/L (ref 22–29)
CREAT SERPL-MCNC: 1.1 MG/DL (ref 0.76–1.27)
DEPRECATED RDW RBC AUTO: 44.1 FL (ref 37–54)
EGFRCR SERPLBLD CKD-EPI 2021: 75 ML/MIN/1.73
EOSINOPHIL # BLD AUTO: 0.27 10*3/MM3 (ref 0–0.4)
EOSINOPHIL NFR BLD AUTO: 3.3 % (ref 0.3–6.2)
ERYTHROCYTE [DISTWIDTH] IN BLOOD BY AUTOMATED COUNT: 13.3 % (ref 12.3–15.4)
GLOBULIN UR ELPH-MCNC: 3 GM/DL
GLUCOSE SERPL-MCNC: 100 MG/DL (ref 65–99)
HBA1C MFR BLD: 6.2 % (ref 4.8–5.6)
HCT VFR BLD AUTO: 45.6 % (ref 37.5–51)
HCV AB SER QL: NORMAL
HDLC SERPL-MCNC: 38 MG/DL (ref 40–60)
HGB BLD-MCNC: 15.1 G/DL (ref 13–17.7)
IMM GRANULOCYTES # BLD AUTO: 0.03 10*3/MM3 (ref 0–0.05)
IMM GRANULOCYTES NFR BLD AUTO: 0.4 % (ref 0–0.5)
LDLC SERPL CALC-MCNC: 103 MG/DL (ref 0–100)
LDLC/HDLC SERPL: 2.59 {RATIO}
LYMPHOCYTES # BLD AUTO: 1.09 10*3/MM3 (ref 0.7–3.1)
LYMPHOCYTES NFR BLD AUTO: 13.4 % (ref 19.6–45.3)
MCH RBC QN AUTO: 29.7 PG (ref 26.6–33)
MCHC RBC AUTO-ENTMCNC: 33.1 G/DL (ref 31.5–35.7)
MCV RBC AUTO: 89.6 FL (ref 79–97)
MONOCYTES # BLD AUTO: 0.65 10*3/MM3 (ref 0.1–0.9)
MONOCYTES NFR BLD AUTO: 8 % (ref 5–12)
NEUTROPHILS NFR BLD AUTO: 6 10*3/MM3 (ref 1.7–7)
NEUTROPHILS NFR BLD AUTO: 73.9 % (ref 42.7–76)
NRBC BLD AUTO-RTO: 0 /100 WBC (ref 0–0.2)
PLATELET # BLD AUTO: 229 10*3/MM3 (ref 140–450)
PMV BLD AUTO: 10.1 FL (ref 6–12)
POTASSIUM SERPL-SCNC: 4.3 MMOL/L (ref 3.5–5.2)
PROT SERPL-MCNC: 7.2 G/DL (ref 6–8.5)
PSA SERPL-MCNC: 0.23 NG/ML (ref 0–4)
RBC # BLD AUTO: 5.09 10*6/MM3 (ref 4.14–5.8)
SODIUM SERPL-SCNC: 141 MMOL/L (ref 136–145)
TRIGL SERPL-MCNC: 177 MG/DL (ref 0–150)
TSH SERPL DL<=0.05 MIU/L-ACNC: 0.99 UIU/ML (ref 0.27–4.2)
VLDLC SERPL-MCNC: 31 MG/DL (ref 5–40)
WBC NRBC COR # BLD AUTO: 8.12 10*3/MM3 (ref 3.4–10.8)

## 2024-09-20 PROCEDURE — 80061 LIPID PANEL: CPT

## 2024-09-20 PROCEDURE — 36415 COLL VENOUS BLD VENIPUNCTURE: CPT

## 2024-09-20 PROCEDURE — 84443 ASSAY THYROID STIM HORMONE: CPT

## 2024-09-20 PROCEDURE — 80053 COMPREHEN METABOLIC PANEL: CPT

## 2024-09-20 PROCEDURE — 99204 OFFICE O/P NEW MOD 45 MIN: CPT | Performed by: NURSE PRACTITIONER

## 2024-09-20 PROCEDURE — 85025 COMPLETE CBC W/AUTO DIFF WBC: CPT

## 2024-09-20 PROCEDURE — 83036 HEMOGLOBIN GLYCOSYLATED A1C: CPT

## 2024-09-20 PROCEDURE — 86803 HEPATITIS C AB TEST: CPT

## 2024-09-20 PROCEDURE — G0103 PSA SCREENING: HCPCS

## 2024-09-20 RX ORDER — HYDROCODONE BITARTRATE AND ACETAMINOPHEN 10; 325 MG/1; MG/1
4 TABLET ORAL EVERY 4 HOURS PRN
COMMUNITY
Start: 2024-08-16

## 2024-09-20 RX ORDER — IBUPROFEN 200 MG
600 TABLET ORAL
COMMUNITY

## 2024-09-20 RX ORDER — METOPROLOL SUCCINATE 25 MG/1
25 TABLET, EXTENDED RELEASE ORAL DAILY
Qty: 30 TABLET | Refills: 0 | Status: SHIPPED | OUTPATIENT
Start: 2024-09-20

## 2024-09-23 DIAGNOSIS — E78.5 HYPERLIPIDEMIA, UNSPECIFIED HYPERLIPIDEMIA TYPE: Primary | ICD-10-CM

## 2024-09-23 DIAGNOSIS — E78.2 MIXED HYPERLIPIDEMIA: Primary | ICD-10-CM

## 2024-09-23 RX ORDER — ATORVASTATIN CALCIUM 20 MG/1
20 TABLET, FILM COATED ORAL NIGHTLY
Qty: 90 TABLET | Refills: 0 | Status: SHIPPED | OUTPATIENT
Start: 2024-09-23

## 2024-10-01 ENCOUNTER — TELEPHONE (OUTPATIENT)
Dept: FAMILY MEDICINE CLINIC | Age: 64
End: 2024-10-01
Payer: COMMERCIAL

## 2024-10-03 NOTE — TELEPHONE ENCOUNTER
Caller: Tyrone Painting    Relationship: Self    Best call back number: [phone]     What is the best time to reach you: ANY    Who are you requesting to speak with (clinical staff, provider,  specific staff member): CLINICAL / BILLING    What was the call regarding: PATIENT STATED CURRENT BLOOD PRESSURE MEDICATION IS NOT WORKING AND WOULD LIKE TO DISCUSS WITH NURSE. PATIENT ALSO HAS BILLING QUESTIONS.        
Detailed voice message left for patient   
Patient states BP has been 145/85. Please advise     
Transferred

## 2024-10-04 ENCOUNTER — HOSPITAL ENCOUNTER (OUTPATIENT)
Dept: CT IMAGING | Facility: HOSPITAL | Age: 64
Discharge: HOME | End: 2024-10-04
Payer: COMMERCIAL

## 2024-10-04 DIAGNOSIS — Z12.2 SCREENING FOR LUNG CANCER: ICD-10-CM

## 2024-10-04 DIAGNOSIS — Z87.891 PERSONAL HISTORY OF TOBACCO USE, PRESENTING HAZARDS TO HEALTH: ICD-10-CM

## 2024-10-04 PROCEDURE — 71271 CT THORAX LUNG CANCER SCR C-: CPT

## 2024-10-07 ENCOUNTER — TELEPHONE (OUTPATIENT)
Dept: FAMILY MEDICINE CLINIC | Age: 64
End: 2024-10-07
Payer: COMMERCIAL

## 2024-10-07 NOTE — TELEPHONE ENCOUNTER
Okay we will make sure that we get the readings from him such as tessa in his chart so that we can remember to contact him in case he does not contact us back.

## 2024-10-07 NOTE — TELEPHONE ENCOUNTER
High and low, 150//100, he is not at home where he has them written down, he will send a [employer] message tomorrow with bp readings

## 2024-10-07 NOTE — PROGRESS NOTES
There are scattered areas of groundglass density that could reflect sequela to inflammatory infectious process. There are some tree-in-bud type densities as well in the right upper lobe that could relate to an infectious process within this area.  2.Small noncalcified pulm nodule left lower lobe.  3.Coronary artery calcification.  4.Small hiatal hernia    Recommendation:  6 month follow up with LDCT  Does he have any cough , fever or mucous production, this could be inflammatory and not infection related change. Repeat low dose CT in 6mo

## 2024-10-07 NOTE — TELEPHONE ENCOUNTER
----- Message from Denise rutledge sent at 9/23/2024  8:58 AM EDT -----  Call in a couple weeks to get blood pressure readings on new blood pressure medication.

## 2024-10-11 ENCOUNTER — TELEPHONE (OUTPATIENT)
Dept: FAMILY MEDICINE CLINIC | Age: 64
End: 2024-10-11
Payer: COMMERCIAL

## 2024-10-11 DIAGNOSIS — I10 PRIMARY HYPERTENSION: Primary | ICD-10-CM

## 2024-10-11 RX ORDER — METOPROLOL SUCCINATE 50 MG/1
50 TABLET, EXTENDED RELEASE ORAL DAILY
Qty: 30 TABLET | Refills: 1 | Status: SHIPPED | OUTPATIENT
Start: 2024-10-11 | End: 2024-12-10

## 2024-10-11 NOTE — TELEPHONE ENCOUNTER
Bp readings:    153/94  144/91  158/87  149/88  155/100  173/97  145/88  146/85  143/91  174/99  145/81  132/84  147/80  148/86  132/78  150/91  161/87

## 2024-10-11 NOTE — TELEPHONE ENCOUNTER
Blood pressure average 150/90s not controlled. Taking Metoprolol 25 mg daily , increase to 50mg orally daily, so he can take 2 of the tabs he has  now until used up and then the new dose will be at the pharmacy for him to pu. Repeat BP readings, starting in 1 week, check once to twice daily for 2 weeks , call readings back into office

## 2024-12-05 DIAGNOSIS — I10 PRIMARY HYPERTENSION: ICD-10-CM

## 2024-12-05 RX ORDER — METOPROLOL SUCCINATE 50 MG/1
50 TABLET, EXTENDED RELEASE ORAL DAILY
Qty: 30 TABLET | Refills: 0 | Status: SHIPPED | OUTPATIENT
Start: 2024-12-05 | End: 2024-12-06 | Stop reason: SDUPTHER

## 2024-12-06 ENCOUNTER — OFFICE VISIT (OUTPATIENT)
Dept: FAMILY MEDICINE CLINIC | Age: 64
End: 2024-12-06
Payer: COMMERCIAL

## 2024-12-06 VITALS
DIASTOLIC BLOOD PRESSURE: 85 MMHG | HEART RATE: 66 BPM | HEIGHT: 72 IN | BODY MASS INDEX: 27.9 KG/M2 | WEIGHT: 206 LBS | OXYGEN SATURATION: 98 % | TEMPERATURE: 98.2 F | SYSTOLIC BLOOD PRESSURE: 152 MMHG

## 2024-12-06 DIAGNOSIS — F17.201 MODERATE TOBACCO USE DISORDER, IN EARLY REMISSION: ICD-10-CM

## 2024-12-06 DIAGNOSIS — E78.2 MIXED HYPERLIPIDEMIA: ICD-10-CM

## 2024-12-06 DIAGNOSIS — I10 PRIMARY HYPERTENSION: Primary | ICD-10-CM

## 2024-12-06 PROBLEM — M51.369 DEGENERATION OF LUMBAR INTERVERTEBRAL DISC: Status: ACTIVE | Noted: 2024-09-20

## 2024-12-06 PROBLEM — M54.17 LUMBOSACRAL RADICULOPATHY: Status: ACTIVE | Noted: 2024-10-01

## 2024-12-06 PROCEDURE — 99214 OFFICE O/P EST MOD 30 MIN: CPT | Performed by: NURSE PRACTITIONER

## 2024-12-06 RX ORDER — METOPROLOL SUCCINATE 50 MG/1
50 TABLET, EXTENDED RELEASE ORAL DAILY
Qty: 90 TABLET | Refills: 0 | Status: SHIPPED | OUTPATIENT
Start: 2024-12-06

## 2024-12-06 RX ORDER — LISINOPRIL 10 MG/1
10 TABLET ORAL NIGHTLY
Qty: 90 TABLET | Refills: 0 | Status: SHIPPED | OUTPATIENT
Start: 2024-12-06

## 2024-12-06 NOTE — PROGRESS NOTES
"Chief Complaint  Hypertension (3 month f/u)    Subjective        Tyrone Painting presents to [facility] today for follow-up on hypertension, taking metoprolol 50 mg once daily.  States blood pressure has remained elevated on the 50 mg of metoprolol.  150s over 80s mainly.    Also follow-up on hyperlipidemia, taking Lipitor 20 mg nightly, when he can remember.  Last lipid done 9/20/2024, triglycerides 177, and .      Current Outpatient Medications:     atorvastatin (LIPITOR) 20 MG tablet, Take 1 tablet by mouth Every Night., Disp: 90 tablet, Rfl: 0    HYDROcodone-acetaminophen (NORCO)  MG per tablet, Take 4 tablets by mouth Every 4 (Four) Hours As Needed., Disp: , Rfl:     ibuprofen (ADVIL,MOTRIN) 200 MG tablet, Take 3 tablets by mouth., Disp: , Rfl:     metoprolol succinate XL (TOPROL-XL) 50 MG 24 hr tablet, Take 1 tablet by mouth Daily., Disp: 90 tablet, Rfl: 0    lisinopril (PRINIVIL,ZESTRIL) 10 MG tablet, Take 1 tablet by mouth Every Night., Disp: 90 tablet, Rfl: 0  Medications Discontinued During This Encounter   Medication Reason    metoprolol succinate XL (TOPROL-XL) 50 MG 24 hr tablet Reorder         Allergies:  Patient has no known allergies.      Objective   Vital Signs:   Vitals:    12/06/24 1657   BP: 152/85   BP Location: Right arm   Patient Position: Sitting   Cuff Size: Adult   Pulse: 66   Temp: 98.2 °F (36.8 °C)   TempSrc: Oral   SpO2: 98%   Weight: 93.4 kg (206 lb)   Height: 182.9 cm (72.01\")     Body mass index is 27.93 kg/m².           Physical Exam  Constitutional:       Appearance: Normal appearance.   Neck:      Vascular: No carotid bruit.   Cardiovascular:      Rate and Rhythm: Normal rate and regular rhythm.      Heart sounds: Normal heart sounds.   Pulmonary:      Effort: Pulmonary effort is normal.      Breath sounds: Normal breath sounds.   Musculoskeletal:         General: Normal range of motion.   Skin:     General: Skin is warm and dry. "   Neurological:      General: No focal deficit present.      Mental Status: He is alert.   Psychiatric:         Mood and Affect: Mood normal.         Behavior: Behavior normal.             Lab Results   Component Value Date    GLUCOSE 100 (H) 09/20/2024    BUN 13 09/20/2024    CREATININE 1.10 09/20/2024    BCR 11.8 09/20/2024    K 4.3 09/20/2024    CO2 26.5 09/20/2024    CALCIUM 9.4 09/20/2024    ALBUMIN 4.2 09/20/2024    AST 14 09/20/2024    ALT 15 09/20/2024       Lab Results   Component Value Date    CHOL 172 09/20/2024    TRIG 177 (H) 09/20/2024    HDL 38 (L) 09/20/2024     (H) 09/20/2024       Lab Results   Component Value Date    WBC 8.12 09/20/2024    HGB 15.1 09/20/2024    HCT 45.6 09/20/2024    MCV 89.6 09/20/2024     09/20/2024           Procedures         Diagnoses and all orders for this visit:    1. Primary hypertension (Primary)  -     metoprolol succinate XL (TOPROL-XL) 50 MG 24 hr tablet; Take 1 tablet by mouth Daily.  Dispense: 90 tablet; Refill: 0  -     lisinopril (PRINIVIL,ZESTRIL) 10 MG tablet; Take 1 tablet by mouth Every Night.  Dispense: 90 tablet; Refill: 0    2. Mixed hyperlipidemia  Comments:  Continue same dose atorvastatin repeat lipid end of January    3. Moderate tobacco use disorder, in early remission  Comments:  Has quit smoking in the last month, praise given for positive behavior            Follow Up  Return in about 6 months (around 6/6/2025), or if symptoms worsen or fail to improve.  Patient was given instructions and counseling regarding his condition or for health maintenance advice. Please see specific information pulled into the AVS if appropriate.       Will follow-up in a couple weeks with patient via [employer], and my nurse will call them to see how blood pressures are doing home.    Mann Parks, APRN  12/06/2024    Please note that portions of this document were completed using a voice recognition program.

## 2024-12-19 ENCOUNTER — TELEPHONE (OUTPATIENT)
Dept: FAMILY MEDICINE CLINIC | Age: 64
End: 2024-12-19
Payer: COMMERCIAL

## 2024-12-19 NOTE — TELEPHONE ENCOUNTER
----- Message from Denise Parks sent at 12/6/2024  5:26 PM EST -----  Call in around 2 weeks and see how blood pressures are doing at home.

## 2024-12-19 NOTE — TELEPHONE ENCOUNTER
Pt stated his bp readings are around 127/70 in the evening and 150s/80s in the am, prior to medication. He will start checking them after meds and will be in Friday to have labs done. He will send a [employer] message regarding bp's readings once he gets home from work

## 2024-12-20 ENCOUNTER — LAB (OUTPATIENT)
Dept: LAB | Facility: HOSPITAL | Age: 64
End: 2024-12-20
Payer: COMMERCIAL

## 2024-12-20 DIAGNOSIS — E78.2 MIXED HYPERLIPIDEMIA: Primary | ICD-10-CM

## 2024-12-20 DIAGNOSIS — E78.5 HYPERLIPIDEMIA, UNSPECIFIED HYPERLIPIDEMIA TYPE: ICD-10-CM

## 2024-12-20 LAB
ALBUMIN SERPL-MCNC: 4.1 G/DL (ref 3.5–5.2)
ALBUMIN/GLOB SERPL: 1.2 G/DL
ALP SERPL-CCNC: 95 U/L (ref 39–117)
ALT SERPL W P-5'-P-CCNC: 16 U/L (ref 1–41)
ANION GAP SERPL CALCULATED.3IONS-SCNC: 8.6 MMOL/L (ref 5–15)
AST SERPL-CCNC: 16 U/L (ref 1–40)
BILIRUB SERPL-MCNC: 0.5 MG/DL (ref 0–1.2)
BUN SERPL-MCNC: 12 MG/DL (ref 8–23)
BUN/CREAT SERPL: 10.9 (ref 7–25)
CALCIUM SPEC-SCNC: 9.2 MG/DL (ref 8.6–10.5)
CHLORIDE SERPL-SCNC: 100 MMOL/L (ref 98–107)
CHOLEST SERPL-MCNC: 195 MG/DL (ref 0–200)
CO2 SERPL-SCNC: 28.4 MMOL/L (ref 22–29)
CREAT SERPL-MCNC: 1.1 MG/DL (ref 0.76–1.27)
EGFRCR SERPLBLD CKD-EPI 2021: 75 ML/MIN/1.73
GLOBULIN UR ELPH-MCNC: 3.3 GM/DL
GLUCOSE SERPL-MCNC: 115 MG/DL (ref 65–99)
HDLC SERPL-MCNC: 48 MG/DL (ref 40–60)
LDLC SERPL CALC-MCNC: 127 MG/DL (ref 0–100)
LDLC/HDLC SERPL: 2.61 {RATIO}
POTASSIUM SERPL-SCNC: 4.6 MMOL/L (ref 3.5–5.2)
PROT SERPL-MCNC: 7.4 G/DL (ref 6–8.5)
SODIUM SERPL-SCNC: 137 MMOL/L (ref 136–145)
TRIGL SERPL-MCNC: 109 MG/DL (ref 0–150)
VLDLC SERPL-MCNC: 20 MG/DL (ref 5–40)

## 2024-12-20 PROCEDURE — 80061 LIPID PANEL: CPT

## 2024-12-20 PROCEDURE — 36415 COLL VENOUS BLD VENIPUNCTURE: CPT

## 2024-12-20 PROCEDURE — 80053 COMPREHEN METABOLIC PANEL: CPT

## 2024-12-20 RX ORDER — ATORVASTATIN CALCIUM 40 MG/1
40 TABLET, FILM COATED ORAL NIGHTLY
Qty: 90 TABLET | Refills: 0 | Status: SHIPPED | OUTPATIENT
Start: 2024-12-20

## 2024-12-20 NOTE — TELEPHONE ENCOUNTER
Yes please set a tickle to call him back in 2 weeks to get more blood pressure readings and they need to be at least 1 hour after he has had his medications.

## 2024-12-20 NOTE — PROGRESS NOTES
CMP looks good, lipid is not improved LDL was 103 is now 127, increase atorvastatin from 20-40, medication has been sent and repeat lipid in 3 months fasting has been ordered.  Please let him know

## 2025-01-03 ENCOUNTER — TELEPHONE (OUTPATIENT)
Dept: FAMILY MEDICINE CLINIC | Age: 65
End: 2025-01-03
Payer: COMMERCIAL

## 2025-01-03 NOTE — TELEPHONE ENCOUNTER
----- Message from Anna ESQUIVEL sent at 12/20/2024 10:54 AM EST -----   please set a tickle to call him back in 2 weeks to get more blood pressure readings and they need to be at least 1 hour after he has had his medications.

## 2025-03-05 DIAGNOSIS — I10 PRIMARY HYPERTENSION: ICD-10-CM

## 2025-03-05 RX ORDER — LISINOPRIL 10 MG/1
10 TABLET ORAL NIGHTLY
Qty: 90 TABLET | Refills: 0 | Status: SHIPPED | OUTPATIENT
Start: 2025-03-05

## 2025-04-04 ENCOUNTER — TELEPHONE (OUTPATIENT)
Dept: FAMILY MEDICINE CLINIC | Age: 65
End: 2025-04-04
Payer: COMMERCIAL

## 2025-04-04 NOTE — TELEPHONE ENCOUNTER
1st attempt: called pt re overdue Lipid Panel (12/20/2024 12:59)   Unable to leave VM, being that mailbox is full.

## 2025-04-07 ENCOUNTER — TELEPHONE (OUTPATIENT)
Dept: FAMILY MEDICINE CLINIC | Age: 65
End: 2025-04-07
Payer: COMMERCIAL

## 2025-04-07 DIAGNOSIS — R91.8 ABNORMAL CT LUNG SCREENING: ICD-10-CM

## 2025-04-07 DIAGNOSIS — F17.210 CIGARETTE NICOTINE DEPENDENCE WITHOUT COMPLICATION: Primary | ICD-10-CM

## 2025-04-07 NOTE — TELEPHONE ENCOUNTER
Pt calling, he is due for his 6 month CT chest. Can you place order, he will also get his lipid panel done at that time, and any other labs you would like to check, he has a f/u appt 6/6/25

## 2025-04-17 ENCOUNTER — TELEPHONE (OUTPATIENT)
Dept: FAMILY MEDICINE CLINIC | Age: 65
End: 2025-04-17
Payer: COMMERCIAL

## 2025-04-17 NOTE — TELEPHONE ENCOUNTER
Patient requested to cancel his Ct low chest 6 month F/U from order date 4/7/25. Patient states  insurance does not cover it very well and he is unable to pay $2400. Please advise

## 2025-04-18 ENCOUNTER — LAB (OUTPATIENT)
Dept: LAB | Facility: HOSPITAL | Age: 65
End: 2025-04-18
Payer: COMMERCIAL

## 2025-04-18 DIAGNOSIS — E78.2 MIXED HYPERLIPIDEMIA: ICD-10-CM

## 2025-04-18 LAB
CHOLEST SERPL-MCNC: 119 MG/DL (ref 0–200)
HDLC SERPL-MCNC: 40 MG/DL (ref 40–60)
LDLC SERPL CALC-MCNC: 63 MG/DL (ref 0–100)
LDLC/HDLC SERPL: 1.58 {RATIO}
TRIGL SERPL-MCNC: 79 MG/DL (ref 0–150)
VLDLC SERPL-MCNC: 16 MG/DL (ref 5–40)

## 2025-04-18 PROCEDURE — 36415 COLL VENOUS BLD VENIPUNCTURE: CPT

## 2025-04-18 PROCEDURE — 80061 LIPID PANEL: CPT

## 2025-04-19 ENCOUNTER — RESULTS FOLLOW-UP (OUTPATIENT)
Dept: FAMILY MEDICINE CLINIC | Age: 65
End: 2025-04-19
Payer: COMMERCIAL

## 2025-06-06 ENCOUNTER — OFFICE VISIT (OUTPATIENT)
Dept: FAMILY MEDICINE CLINIC | Age: 65
End: 2025-06-06
Payer: COMMERCIAL

## 2025-06-06 VITALS
WEIGHT: 209.4 LBS | BODY MASS INDEX: 28.36 KG/M2 | SYSTOLIC BLOOD PRESSURE: 133 MMHG | TEMPERATURE: 98.3 F | HEART RATE: 65 BPM | DIASTOLIC BLOOD PRESSURE: 82 MMHG | OXYGEN SATURATION: 99 % | HEIGHT: 72 IN

## 2025-06-06 DIAGNOSIS — I10 PRIMARY HYPERTENSION: Primary | ICD-10-CM

## 2025-06-06 DIAGNOSIS — F17.200 TOBACCO USE DISORDER: Chronic | ICD-10-CM

## 2025-06-06 DIAGNOSIS — E78.2 MIXED HYPERLIPIDEMIA: ICD-10-CM

## 2025-06-06 DIAGNOSIS — F17.210 CIGARETTE NICOTINE DEPENDENCE WITHOUT COMPLICATION: ICD-10-CM

## 2025-06-06 DIAGNOSIS — R91.8 ABNORMAL CT LUNG SCREENING: ICD-10-CM

## 2025-06-06 DIAGNOSIS — Z13.6 ENCOUNTER FOR SCREENING FOR CARDIOVASCULAR DISORDERS: ICD-10-CM

## 2025-06-06 PROBLEM — Z87.891 FORMER SMOKER: Chronic | Status: ACTIVE | Noted: 2025-06-06

## 2025-06-06 PROCEDURE — 99214 OFFICE O/P EST MOD 30 MIN: CPT | Performed by: NURSE PRACTITIONER

## 2025-06-06 RX ORDER — METOPROLOL SUCCINATE 50 MG/1
50 TABLET, EXTENDED RELEASE ORAL DAILY
Qty: 90 TABLET | Refills: 0 | Status: SHIPPED | OUTPATIENT
Start: 2025-06-06

## 2025-06-06 RX ORDER — LISINOPRIL 10 MG/1
10 TABLET ORAL NIGHTLY
Qty: 90 TABLET | Refills: 0 | Status: SHIPPED | OUTPATIENT
Start: 2025-06-06

## 2025-06-06 RX ORDER — ATORVASTATIN CALCIUM 40 MG/1
40 TABLET, FILM COATED ORAL NIGHTLY
Qty: 90 TABLET | Refills: 0 | Status: SHIPPED | OUTPATIENT
Start: 2025-06-06

## 2025-06-06 NOTE — PROGRESS NOTES
Chief Complaint  Hypertension (6 month f/u) and Hyperlipidemia (F/u)    Subjective        Tyrone Painting presents to [facility] today for       History of Present Illness  The patient is a 65-year-old male who presents for follow-up on hypertension and hyperlipidemia.    Taking atorvastatin 40 mg nightly with improvement in lipid last lipid done 4/18/2025, LDL at goal 63.  Refill will atorvastatin given today.    Hypertension managed with lisinopril 10 mg and metoprolol 50 mg daily, CMP is normal last CMP was done 12/20/2024.      Previous smoker quit in October, +40+ years of smoking, did have a CT done 10/7/2024 showed...     Impression:  1.There are scattered areas of groundglass density that could reflect sequela to inflammatory infectious process. There are some tree-in-bud type densities as well in the right upper lobe that could relate to an infectious process within this area.  2.Small noncalcified pulm nodule left lower lobe.  3.Coronary artery calcification.  4.Small hiatal hernia     Recommendation:  6 month follow up with LDCT    Has tried to get fu CT here but is going to cost too much, order given to pt to take to [place] [facility] to have done there.     Also , needs AAA screen due to age male , smoking hx. Ordered today ,if expensive here can do elsewhere of choice         Current Outpatient Medications:     atorvastatin (LIPITOR) 40 MG tablet, Take 1 tablet by mouth Every Night., Disp: 90 tablet, Rfl: 0    HYDROcodone-acetaminophen (NORCO)  MG per tablet, Take 4 tablets by mouth Every 4 (Four) Hours As Needed., Disp: , Rfl:     ibuprofen (ADVIL,MOTRIN) 200 MG tablet, Take 3 tablets by mouth., Disp: , Rfl:     lisinopril (PRINIVIL,ZESTRIL) 10 MG tablet, Take 1 tablet by mouth Every Night., Disp: 90 tablet, Rfl: 0    metoprolol succinate XL (TOPROL-XL) 50 MG 24 hr tablet, Take 1 tablet by mouth Daily., Disp: 90 tablet, Rfl: 0  Medications Discontinued During  "This Encounter   Medication Reason    metoprolol succinate XL (TOPROL-XL) 50 MG 24 hr tablet Reorder    atorvastatin (LIPITOR) 40 MG tablet Reorder    lisinopril (PRINIVIL,ZESTRIL) 10 MG tablet Reorder         Allergies:  Patient has no known allergies.      Objective   Vital Signs:   Vitals:    06/06/25 1626   BP: 133/82   BP Location: Right arm   Patient Position: Sitting   Cuff Size: Adult   Pulse: 65   Temp: 98.3 °F (36.8 °C)   TempSrc: Oral   SpO2: 99%   Weight: 95 kg (209 lb 6.4 oz)   Height: 182.9 cm (72.01\")     Body mass index is 28.39 kg/m².           Physical Exam  Constitutional:       Appearance: Normal appearance.   Neck:      Vascular: No carotid bruit.   Cardiovascular:      Rate and Rhythm: Normal rate and regular rhythm.      Heart sounds: Normal heart sounds.   Pulmonary:      Effort: Pulmonary effort is normal.      Breath sounds: Normal breath sounds.   Musculoskeletal:         General: Normal range of motion.   Skin:     General: Skin is warm and dry.   Neurological:      General: No focal deficit present.      Mental Status: He is alert.   Psychiatric:         Mood and Affect: Mood normal.         Behavior: Behavior normal.             Lab Results   Component Value Date    GLUCOSE 115 (H) 12/20/2024    BUN 12 12/20/2024    CREATININE 1.10 12/20/2024    BCR 10.9 12/20/2024    K 4.6 12/20/2024    CO2 28.4 12/20/2024    CALCIUM 9.2 12/20/2024    ALBUMIN 4.1 12/20/2024    AST 16 12/20/2024    ALT 16 12/20/2024       Lab Results   Component Value Date    CHOL 119 04/18/2025    TRIG 79 04/18/2025    HDL 40 04/18/2025    LDL 63 04/18/2025       Lab Results   Component Value Date    WBC 8.12 09/20/2024    HGB 15.1 09/20/2024    HCT 45.6 09/20/2024    MCV 89.6 09/20/2024     09/20/2024       Common labs          9/20/2024    16:10 12/20/2024    07:54 4/18/2025    08:19   Common Labs   Glucose 100  115     BUN 13  12     Creatinine 1.10  1.10     Sodium 141  137     Potassium 4.3  4.6   "   Chloride 106  100     Calcium 9.4  9.2     Albumin 4.2  4.1     Total Bilirubin 0.3  0.5     Alkaline Phosphatase 89  95     AST (SGOT) 14  16     ALT (SGPT) 15  16     WBC 8.12      Hemoglobin 15.1      Hematocrit 45.6      Platelets 229      Total Cholesterol 172  195  119    Triglycerides 177  109  79    HDL Cholesterol 38  48  40    LDL Cholesterol  103  127  63    Hemoglobin A1C 6.20      PSA 0.229        CMP          9/20/2024    16:10 12/20/2024    07:54   CMP   Glucose 100  115    BUN 13  12    Creatinine 1.10  1.10    EGFR 75.0  75.0    Sodium 141  137    Potassium 4.3  4.6    Chloride 106  100    Calcium 9.4  9.2    Total Protein 7.2  7.4    Albumin 4.2  4.1    Globulin 3.0  3.3    Total Bilirubin 0.3  0.5    Alkaline Phosphatase 89  95    AST (SGOT) 14  16    ALT (SGPT) 15  16    Albumin/Globulin Ratio 1.4  1.2    BUN/Creatinine Ratio 11.8  10.9    Anion Gap 8.5  8.6      CBC w/diff          9/20/2024    16:10   CBC w/Diff   WBC 8.12    RBC 5.09    Hemoglobin 15.1    Hematocrit 45.6    MCV 89.6    MCH 29.7    MCHC 33.1    RDW 13.3    Platelets 229    Neutrophil Rel % 73.9    Immature Granulocyte Rel % 0.4    Lymphocyte Rel % 13.4    Monocyte Rel % 8.0    Eosinophil Rel % 3.3    Basophil Rel % 1.0      Lipid Panel          9/20/2024    16:10 12/20/2024    07:54 4/18/2025    08:19   Lipid Panel   Total Cholesterol 172  195  119    Triglycerides 177  109  79    HDL Cholesterol 38  48  40    VLDL Cholesterol 31  20  16    LDL Cholesterol  103  127  63    LDL/HDL Ratio 2.59  2.61  1.58      PSA          9/20/2024    16:10   PSA   PSA 0.229                  Procedures         Diagnoses and all orders for this visit:    1. Primary hypertension (Primary)  -     metoprolol succinate XL (TOPROL-XL) 50 MG 24 hr tablet; Take 1 tablet by mouth Daily.  Dispense: 90 tablet; Refill: 0  -     lisinopril (PRINIVIL,ZESTRIL) 10 MG tablet; Take 1 tablet by mouth Every Night.  Dispense: 90 tablet; Refill: 0  -      aaa  screen limited; Future    2. Mixed hyperlipidemia  -     atorvastatin (LIPITOR) 40 MG tablet; Take 1 tablet by mouth Every Night.  Dispense: 90 tablet; Refill: 0    3. Tobacco use disorder  -     [place] aaa screen limited; Future    4. Encounter for screening for cardiovascular disorders  -     [place]aa screen [place]; Future    5. Cigarette nicotine dependence without complication  -     CT Chest Low Dose Follow Up Without Contrast; Future    6. Abnormal CT lung screening  -     CT Chest Low Dose Follow Up Without Contrast; Future          Assessment & Plan        Declines pna, Shingles and Covid vaccine         Follow Up  Return in about 3 months (around 9/6/2025) for Annual physical.  Patient was given instructions and counseling regarding his condition or for health maintenance advice. Please see specific information pulled into the AVS if appropriate.           ECTOR Mooney  06/06/2025    Please note that portions of this document were completed using a voice recognition program.     Patient or patient representative verbalized consent for the use of Ambient Listening during the visit with  ECTOR Mooney for chart documentation. 6/6/2025  16:28 EDT

## 2025-07-01 ENCOUNTER — HOSPITAL ENCOUNTER (OUTPATIENT)
Dept: OTHER | Facility: HOSPITAL | Age: 65
Discharge: HOME | End: 2025-07-01

## 2025-07-03 ENCOUNTER — RESULTS FOLLOW-UP (OUTPATIENT)
Dept: FAMILY MEDICINE CLINIC | Age: 65
End: 2025-07-03
Payer: COMMERCIAL

## 2025-07-03 DIAGNOSIS — R91.1 PULMONARY NODULE, LEFT: Primary | ICD-10-CM

## 2025-07-07 ENCOUNTER — HOSPITAL ENCOUNTER (OUTPATIENT)
Dept: ULTRASOUND IMAGING | Facility: HOSPITAL | Age: 65
Discharge: HOME | End: 2025-07-07
Admitting: NURSE PRACTITIONER

## 2025-07-07 DIAGNOSIS — Z13.6 ENCOUNTER FOR SCREENING FOR CARDIOVASCULAR DISORDERS: ICD-10-CM

## 2025-07-07 DIAGNOSIS — F17.200 TOBACCO USE DISORDER: Chronic | ICD-10-CM

## 2025-07-07 DIAGNOSIS — I10 PRIMARY HYPERTENSION: ICD-10-CM

## 2025-07-07 PROCEDURE — 76706 US ABDL AORTA SCREEN AAA: CPT

## 2025-07-09 ENCOUNTER — OFFICE VISIT (OUTPATIENT)
Dept: PULMONOLOGY | Facility: CLINIC | Age: 65
End: 2025-07-09
Payer: COMMERCIAL

## 2025-07-09 VITALS
OXYGEN SATURATION: 99 % | WEIGHT: 211.6 LBS | BODY MASS INDEX: 28.66 KG/M2 | TEMPERATURE: 97.5 F | HEART RATE: 60 BPM | HEIGHT: 72 IN | RESPIRATION RATE: 18 BRPM | SYSTOLIC BLOOD PRESSURE: 133 MMHG | DIASTOLIC BLOOD PRESSURE: 88 MMHG

## 2025-07-09 DIAGNOSIS — J43.2 CENTRILOBULAR EMPHYSEMA: ICD-10-CM

## 2025-07-09 DIAGNOSIS — R91.1 LUNG NODULE: ICD-10-CM

## 2025-07-09 DIAGNOSIS — Z87.891 FORMER SMOKER: Chronic | ICD-10-CM

## 2025-07-09 DIAGNOSIS — F17.210 CIGARETTE NICOTINE DEPENDENCE WITHOUT COMPLICATION: Primary | ICD-10-CM

## 2025-07-09 PROCEDURE — 99204 OFFICE O/P NEW MOD 45 MIN: CPT | Performed by: INTERNAL MEDICINE

## 2025-07-09 NOTE — PROGRESS NOTES
Primary Care Provider  Denise Parks APRN     Referring Provider  Denise Parks, *     Chief Complaint  Establish Care (1.9 cm left lower lobe) and Shortness of Breath    Subjective          Tyrone Painting presents to [facility]  History of Present Illness  Tyrone Painting is a 65 y.o. male patient   History of Present Illness  The patient is a 65-year-old male referred for a lung nodule.    He reports no cough or wheezing but does experience shortness of breath during activities. He can ascend one or two flights of stairs without difficulty and walk on level ground for a considerable distance before experiencing breathlessness. His activity level remains unchanged, and he does not experience discomfort during strenuous activities. He does not produce any phlegm when he coughs. He also reports no night sweats or loss of appetite. Since quitting smoking in 10/2024, he has gained 10 pounds. He experiences occasional dizziness but reports no leg swelling. He has noticed some indentation on his legs where his socks sit.    He smoked cigarettes for over 40 years, consuming approximately one pack per day before quitting in 10/2024. He reports no exposure to secondhand smoke. He has not undergone a lung function test to assess the severity of his COPD, which he describes as mild.    A CT scan performed in 09/2024 revealed a lung nodule, even though he was asymptomatic at the time. He has never been diagnosed with pneumonia.    SOCIAL HISTORY  Exercise: The patient reports being fairly active and can go one or two flights of stairs without problem.  Tobacco: The patient quit smoking in October after smoking a pack a day for over 40 years. Reports no secondhand smoking exposure.    FAMILY HISTORY  - Negative for lung disease and lung cancer in family history.    Review of Systems     General:  No Fatigue, No Fever No weight loss or loss of  appetite  HEENT: No dysphagia, No Visual Changes, no rhinorrhea  Respiratory:  + cough,+Dyspnea, intermittent phlegm, No Pleuritic Pain, no wheezing, no hemoptysis.  Cardiovascular: Denies chest pain, denies palpitations,+BERGMAN, No Chest Pressure  Gastrointestinal:  No Abdominal Pain, No Nausea, No Vomiting, No Diarrhea  Genitourinary:  No Dysuria, No Frequency, No Hesitancy  Musculoskeletal: No muscle pain or swelling  Endocrine:  No Heat Intolerance, No Cold Intolerance  Hematologic:  No Bleeding, No Bruising  Psychiatric:  No Anxiety, No Depression  Neurologic:  No Confusion, no Dysarthria, No Headaches  Skin:  No Rash, No Open Wounds    Family History   Problem Relation Age of Onset    Dementia Mother     Stroke Mother     Colon cancer Father     Cancer Father         Colon    Dementia Maternal Grandmother     Prostate cancer Maternal Grandfather         Social History     Socioeconomic History    Marital status:    Tobacco Use    Smoking status: Former     Current packs/day: 1.00     Average packs/day: 1 pack/day for 43.4 years (43.4 ttl pk-yrs)     Types: Cigarettes     Start date: 1/31/1982    Smokeless tobacco: Never   Vaping Use    Vaping status: Never Used   Substance and Sexual Activity    Alcohol use: Not Currently     Comment: casual    Drug use: Never    Sexual activity: Not Currently     Partners: Female        Past Medical History:   Diagnosis Date    Hyperlipidemia     Hypertension     Low back pain           There is no immunization history on file for this patient.      No Known Allergies       Current Outpatient Medications:     atorvastatin (LIPITOR) 40 MG tablet, Take 1 tablet by mouth Every Night., Disp: 90 tablet, Rfl: 0    HYDROcodone-acetaminophen (NORCO)  MG per tablet, Take 4 tablets by mouth Every 4 (Four) Hours As Needed., Disp: , Rfl:     ibuprofen (ADVIL,MOTRIN) 200 MG tablet, Take 3 tablets by mouth., Disp: , Rfl:     lisinopril (PRINIVIL,ZESTRIL) 10 MG tablet, Take 1  "tablet by mouth Every Night., Disp: 90 tablet, Rfl: 0    metoprolol succinate XL (TOPROL-XL) 50 MG 24 hr tablet, Take 1 tablet by mouth Daily., Disp: 90 tablet, Rfl: 0     Objective   Physical Exam  Physical Exam      Vital Signs:   /88 (BP Location: Left arm, Patient Position: Sitting, Cuff Size: Adult)   Pulse 60   Temp 97.5 °F (36.4 °C) (Tympanic)   Resp 18   Ht 182.9 cm (72.01\")   Wt 96 kg (211 lb 9.6 oz)   SpO2 99% Comment: room air  BMI 28.69 kg/m²       Vital Signs Reviewed  WDWN, Alert, in no acute distress  HEENT:  PERRL, EOMI.  OP, nares clear, no sinus tenderness  Mallampatti classification : 1  Neck:  Supple, no JVD, no thyromegaly  Lymph: no axillary, cervical, supraclavicular lymphadenopathy noted bilaterally  Chest:  good aeration, bilateral diminished breath sounds, no wheezing, crackles or rhonchi, resonant to percussion b/l  CV: RRR, no MGR, pulses 2+, equal.  Abd:  Soft, NT, ND, + BS, no HSM  EXT:  no clubbing, no cyanosis, No BLE edema  Neuro:  A&Ox3, CN grossly intact, no focal deficits.  Skin: No rashes or lesions noted     Result Review :   The following data was reviewed by: Jean Pierre Ureña MD on 07/09/2025:  Common labs          9/20/2024    16:10 12/20/2024    07:54 4/18/2025    08:19   Common Labs   Glucose 100  115     BUN 13  12     Creatinine 1.10  1.10     Sodium 141  137     Potassium 4.3  4.6     Chloride 106  100     Calcium 9.4  9.2     Albumin 4.2  4.1     Total Bilirubin 0.3  0.5     Alkaline Phosphatase 89  95     AST (SGOT) 14  16     ALT (SGPT) 15  16     WBC 8.12      Hemoglobin 15.1      Hematocrit 45.6      Platelets 229      Total Cholesterol 172  195  119    Triglycerides 177  109  79    HDL Cholesterol 38  48  40    LDL Cholesterol  103  127  63    Hemoglobin A1C 6.20      PSA 0.229        CMP          9/20/2024    16:10 12/20/2024    07:54   CMP   Glucose 100  115    BUN 13  12    Creatinine 1.10  1.10    EGFR 75.0  75.0    Sodium 141  137    Potassium 4.3  " "4.6    Chloride 106  100    Calcium 9.4  9.2    Total Protein 7.2  7.4    Albumin 4.2  4.1    Globulin 3.0  3.3    Total Bilirubin 0.3  0.5    Alkaline Phosphatase 89  95    AST (SGOT) 14  16    ALT (SGPT) 15  16    Albumin/Globulin Ratio 1.4  1.2    BUN/Creatinine Ratio 11.8  10.9    Anion Gap 8.5  8.6      CBC          2024    16:10   CBC   WBC 8.12    RBC 5.09    Hemoglobin 15.1    Hematocrit 45.6    MCV 89.6    MCH 29.7    MCHC 33.1    RDW 13.3    Platelets 229      CBC w/diff          2024    16:10   CBC w/Diff   WBC 8.12    RBC 5.09    Hemoglobin 15.1    Hematocrit 45.6    MCV 89.6    MCH 29.7    MCHC 33.1    RDW 13.3    Platelets 229    Neutrophil Rel % 73.9    Immature Granulocyte Rel % 0.4    Lymphocyte Rel % 13.4    Monocyte Rel % 8.0    Eosinophil Rel % 3.3    Basophil Rel % 1.0      Data reviewed : Radiologic studies Previous imaging reviewed.      No results found for: \"SITE\", \"ALLENTEST\", \"PHART\", \"ZJV4CHK\", \"PO2ART\", \"OXH0OVV\", \"BASEEXCESS\", \"T0SKDSYI\", \"HGBBG\", \"HCTABG\", \"OXYHEMOGLOBI\", \"METHHGBN\", \"CARBOXYHGB\", \"CO2CT\", \"BAROMETRIC\", \"MODALITY\", \"FIO2\"   No results found for this or any previous visit.              Results  Imaging   - CAT scan of the right and left lun2024, Areas of abnormality appearing as patches of pneumonia or chronic inflammation.   - CAT scan of the left lower lobe: 2024, A new lung nodule identified.             Assessment and Plan    Diagnoses and all orders for this visit:    1. Cigarette nicotine dependence without complication (Primary)  -     NM PET/CT Skull Base to Mid Thigh; Future  -     Complete PFT - Pre & Post Bronchodilator; Future    2. Former smoker  -     NM PET/CT Skull Base to Mid Thigh; Future  -     Complete PFT - Pre & Post Bronchodilator; Future    3. Centrilobular emphysema  -     NM PET/CT Skull Base to Mid Thigh; Future  -     Complete PFT - Pre & Post Bronchodilator; Future    4. Lung nodule  -     NM PET/CT Skull Base to Mid " Thigh; Future  -     Complete PFT - Pre & Post Bronchodilator; Future      Assessment & Plan  1. Lung nodule.  The patient's previous CT scan from 09/2024 shows areas of abnormality in the right and left lung, appearing as patches of pneumonia or chronic inflammation. A new lung nodule was identified in the left lower lobe. The differential diagnosis includes chronic inflammation, fungal infection, atypical bacterial infection, or malignancy. A PET scan will be ordered to further evaluate the lung nodule. If the PET scan shows active cells, a biopsy will be considered. The method of biopsy will depend on the location and appearance of the nodule. If the nodule is closer to the pleural cavity, an external biopsy may be performed; otherwise, a robotic bronchoscopy will be considered. If the nodule is found to be an infection, appropriate antibiotic treatment will be initiated, though scarring may occur due to the chronic nature of the infection.    2. Chronic obstructive pulmonary disease (COPD).  The patient has a history of smoking a pack a day for over 40 years and quit in 10/2024. He experiences shortness of breath with activities but does not use any inhalers. A lung function test will be conducted to assess the severity of COPD and determine if he would qualify for surgery if needed. The test will also help evaluate the extent of lung damage due to heavy smoking.    Follow-up  The patient will follow up in 2 to 3 weeks.    Follow Up   Return in about 2 weeks (around 7/23/2025).  Patient was given instructions and counseling regarding his condition or for health maintenance advice. Please see specific information pulled into the AVS if appropriate.     Patient or patient representative verbalized consent for the use of Ambient Listening during the visit with  Jean Pierre Ureña MD for chart documentation. 7/9/2025  11:12 EDT    Electronically signed by Jean Pierre Ureña MD, 7/9/2025, 12:32 EDT.

## 2025-07-18 ENCOUNTER — HOSPITAL ENCOUNTER (OUTPATIENT)
Dept: PET IMAGING | Facility: HOSPITAL | Age: 65
Discharge: HOME | End: 2025-07-18
Payer: COMMERCIAL

## 2025-07-18 DIAGNOSIS — J43.2 CENTRILOBULAR EMPHYSEMA: ICD-10-CM

## 2025-07-18 DIAGNOSIS — Z87.891 FORMER SMOKER: Chronic | ICD-10-CM

## 2025-07-18 DIAGNOSIS — R91.1 LUNG NODULE: ICD-10-CM

## 2025-07-18 DIAGNOSIS — F17.210 CIGARETTE NICOTINE DEPENDENCE WITHOUT COMPLICATION: ICD-10-CM

## 2025-07-18 PROCEDURE — 34310000005 FLUDEOXYGLUCOSE F18 SOLUTION: Performed by: INTERNAL MEDICINE

## 2025-07-18 PROCEDURE — A9552 F18 FDG: HCPCS | Performed by: INTERNAL MEDICINE

## 2025-07-18 PROCEDURE — 78815 PET IMAGE W/CT SKULL-THIGH: CPT

## 2025-07-18 RX ADMIN — FLUDEOXYGLUCOSE F 18 1 DOSE: 200 INJECTION, SOLUTION INTRAVENOUS at 08:53

## 2025-08-04 ENCOUNTER — OFFICE VISIT (OUTPATIENT)
Dept: PULMONOLOGY | Facility: CLINIC | Age: 65
End: 2025-08-04
Payer: COMMERCIAL

## 2025-08-04 VITALS
DIASTOLIC BLOOD PRESSURE: 91 MMHG | SYSTOLIC BLOOD PRESSURE: 140 MMHG | HEIGHT: 72 IN | BODY MASS INDEX: 28.44 KG/M2 | WEIGHT: 210 LBS | RESPIRATION RATE: 16 BRPM | TEMPERATURE: 97.8 F | HEART RATE: 66 BPM | OXYGEN SATURATION: 97 %

## 2025-08-04 DIAGNOSIS — F17.211 NICOTINE DEPENDENCE, CIGARETTES, IN REMISSION: ICD-10-CM

## 2025-08-04 DIAGNOSIS — J43.2 CENTRILOBULAR EMPHYSEMA: ICD-10-CM

## 2025-08-04 DIAGNOSIS — R91.8 LUNG MASS: Primary | ICD-10-CM

## 2025-08-04 DIAGNOSIS — R91.1 LUNG NODULE: Primary | ICD-10-CM

## 2025-08-04 PROCEDURE — 99214 OFFICE O/P EST MOD 30 MIN: CPT | Performed by: NURSE PRACTITIONER

## 2025-08-12 ENCOUNTER — HOSPITAL ENCOUNTER (OUTPATIENT)
Facility: HOSPITAL | Age: 65
Setting detail: HOSPITAL OUTPATIENT SURGERY
Discharge: HOME | End: 2025-08-12
Attending: INTERNAL MEDICINE | Admitting: INTERNAL MEDICINE
Payer: COMMERCIAL

## 2025-08-12 ENCOUNTER — ANESTHESIA EVENT (OUTPATIENT)
Dept: PERIOP | Facility: HOSPITAL | Age: 65
End: 2025-08-12
Payer: COMMERCIAL

## 2025-08-12 ENCOUNTER — ANESTHESIA (OUTPATIENT)
Dept: PERIOP | Facility: HOSPITAL | Age: 65
End: 2025-08-12
Payer: COMMERCIAL

## 2025-08-12 ENCOUNTER — HOSPITAL ENCOUNTER (OUTPATIENT)
Dept: CT IMAGING | Facility: HOSPITAL | Age: 65
Discharge: HOME | End: 2025-08-12
Payer: COMMERCIAL

## 2025-08-12 ENCOUNTER — APPOINTMENT (OUTPATIENT)
Dept: GENERAL RADIOLOGY | Facility: HOSPITAL | Age: 65
End: 2025-08-12
Payer: COMMERCIAL

## 2025-08-12 DIAGNOSIS — R91.8 LUNG MASS: ICD-10-CM

## 2025-08-12 PROCEDURE — 71250 CT THORAX DX C-: CPT

## 2025-08-12 PROCEDURE — 25010000002 PROPOFOL 10 MG/ML EMULSION: Performed by: NURSE ANESTHETIST, CERTIFIED REGISTERED

## 2025-08-12 PROCEDURE — 25010000002 DEXAMETHASONE PER 1 MG: Performed by: NURSE ANESTHETIST, CERTIFIED REGISTERED

## 2025-08-12 PROCEDURE — 25010000002 FENTANYL CITRATE (PF) 50 MCG/ML SOLUTION: Performed by: NURSE ANESTHETIST, CERTIFIED REGISTERED

## 2025-08-12 PROCEDURE — 25010000002 LIDOCAINE PF 2% 2 % SOLUTION: Performed by: NURSE ANESTHETIST, CERTIFIED REGISTERED

## 2025-08-12 PROCEDURE — 25810000003 LACTATED RINGERS PER 1000 ML: Performed by: ANESTHESIOLOGY

## 2025-08-12 PROCEDURE — 25010000002 ONDANSETRON PER 1 MG: Performed by: NURSE ANESTHETIST, CERTIFIED REGISTERED

## 2025-08-12 PROCEDURE — 25010000002 SUGAMMADEX 200 MG/2ML SOLUTION: Performed by: NURSE ANESTHETIST, CERTIFIED REGISTERED

## 2025-08-12 RX ORDER — LIDOCAINE HYDROCHLORIDE 20 MG/ML
INJECTION, SOLUTION EPIDURAL; INFILTRATION; INTRACAUDAL; PERINEURAL AS NEEDED
Status: DISCONTINUED | OUTPATIENT
Start: 2025-08-12 | End: 2025-08-12 | Stop reason: SURG

## 2025-08-12 RX ORDER — PROPOFOL 10 MG/ML
VIAL (ML) INTRAVENOUS AS NEEDED
Status: DISCONTINUED | OUTPATIENT
Start: 2025-08-12 | End: 2025-08-12 | Stop reason: SURG

## 2025-08-12 RX ORDER — ROCURONIUM BROMIDE 10 MG/ML
INJECTION, SOLUTION INTRAVENOUS AS NEEDED
Status: DISCONTINUED | OUTPATIENT
Start: 2025-08-12 | End: 2025-08-12 | Stop reason: SURG

## 2025-08-12 RX ORDER — FENTANYL CITRATE 50 UG/ML
INJECTION, SOLUTION INTRAMUSCULAR; INTRAVENOUS AS NEEDED
Status: DISCONTINUED | OUTPATIENT
Start: 2025-08-12 | End: 2025-08-12 | Stop reason: SURG

## 2025-08-12 RX ORDER — ONDANSETRON 2 MG/ML
INJECTION INTRAMUSCULAR; INTRAVENOUS AS NEEDED
Status: DISCONTINUED | OUTPATIENT
Start: 2025-08-12 | End: 2025-08-12 | Stop reason: SURG

## 2025-08-12 RX ORDER — BUPIVACAINE HCL/0.9 % NACL/PF 0.125 %
PLASTIC BAG, INJECTION (ML) EPIDURAL AS NEEDED
Status: DISCONTINUED | OUTPATIENT
Start: 2025-08-12 | End: 2025-08-12 | Stop reason: SURG

## 2025-08-12 RX ORDER — DEXAMETHASONE SODIUM PHOSPHATE 4 MG/ML
INJECTION, SOLUTION INTRA-ARTICULAR; INTRALESIONAL; INTRAMUSCULAR; INTRAVENOUS; SOFT TISSUE AS NEEDED
Status: DISCONTINUED | OUTPATIENT
Start: 2025-08-12 | End: 2025-08-12 | Stop reason: SURG

## 2025-08-12 RX ADMIN — Medication 200 MCG: at 14:50

## 2025-08-12 RX ADMIN — Medication 200 MCG: at 14:35

## 2025-08-12 RX ADMIN — FENTANYL CITRATE 100 MCG: 50 INJECTION, SOLUTION INTRAMUSCULAR; INTRAVENOUS at 14:11

## 2025-08-12 RX ADMIN — ONDANSETRON 4 MG: 2 INJECTION, SOLUTION INTRAMUSCULAR; INTRAVENOUS at 14:18

## 2025-08-12 RX ADMIN — SUGAMMADEX 200 MG: 100 INJECTION, SOLUTION INTRAVENOUS at 15:24

## 2025-08-12 RX ADMIN — Medication 100 MCG: at 14:44

## 2025-08-12 RX ADMIN — LIDOCAINE HYDROCHLORIDE 80 MG: 20 INJECTION, SOLUTION EPIDURAL; INFILTRATION; INTRACAUDAL; PERINEURAL at 14:11

## 2025-08-12 RX ADMIN — PROPOFOL 150 MCG/KG/MIN: 10 INJECTION, EMULSION INTRAVENOUS at 14:11

## 2025-08-12 RX ADMIN — PROPOFOL 150 MG: 10 INJECTION, EMULSION INTRAVENOUS at 14:11

## 2025-08-12 RX ADMIN — DEXAMETHASONE SODIUM PHOSPHATE 4 MG: 4 INJECTION, SOLUTION INTRAMUSCULAR; INTRAVENOUS at 14:18

## 2025-08-12 RX ADMIN — ROCURONIUM BROMIDE 50 MG: 10 INJECTION, SOLUTION INTRAVENOUS at 14:11

## 2025-08-12 RX ADMIN — ROCURONIUM BROMIDE 20 MG: 10 INJECTION, SOLUTION INTRAVENOUS at 15:05

## 2025-08-12 RX ADMIN — SODIUM CHLORIDE, POTASSIUM CHLORIDE, SODIUM LACTATE AND CALCIUM CHLORIDE: 600; 310; 30; 20 INJECTION, SOLUTION INTRAVENOUS at 15:05

## 2025-08-19 ENCOUNTER — TELEPHONE (OUTPATIENT)
Dept: ADMINISTRATIVE | Facility: OTHER | Age: 65
End: 2025-08-19
Payer: COMMERCIAL

## 2025-08-21 ENCOUNTER — OFFICE VISIT (OUTPATIENT)
Dept: PULMONOLOGY | Facility: CLINIC | Age: 65
End: 2025-08-21
Payer: COMMERCIAL

## 2025-08-21 VITALS
RESPIRATION RATE: 18 BRPM | DIASTOLIC BLOOD PRESSURE: 92 MMHG | TEMPERATURE: 98 F | HEIGHT: 72 IN | WEIGHT: 200 LBS | SYSTOLIC BLOOD PRESSURE: 147 MMHG | BODY MASS INDEX: 27.09 KG/M2 | OXYGEN SATURATION: 99 % | HEART RATE: 60 BPM

## 2025-08-21 DIAGNOSIS — J43.2 CENTRILOBULAR EMPHYSEMA: ICD-10-CM

## 2025-08-21 DIAGNOSIS — R91.8 LUNG MASS: Primary | ICD-10-CM

## 2025-08-21 DIAGNOSIS — R91.1 LUNG NODULE: ICD-10-CM

## 2025-08-21 DIAGNOSIS — F17.211 NICOTINE DEPENDENCE, CIGARETTES, IN REMISSION: ICD-10-CM

## 2025-08-21 DIAGNOSIS — C85.10 B-CELL LYMPHOMA, UNSPECIFIED B-CELL LYMPHOMA TYPE, UNSPECIFIED BODY REGION: ICD-10-CM

## 2025-08-21 DIAGNOSIS — R94.8 ABNORMAL POSITRON EMISSION TOMOGRAPHY (PET) SCAN: ICD-10-CM

## 2025-08-21 PROCEDURE — 99214 OFFICE O/P EST MOD 30 MIN: CPT | Performed by: NURSE PRACTITIONER

## 2025-08-29 ENCOUNTER — TELEPHONE (OUTPATIENT)
Dept: GASTROENTEROLOGY | Facility: CLINIC | Age: 65
End: 2025-08-29
Payer: COMMERCIAL

## 2025-08-29 ENCOUNTER — OFFICE VISIT (OUTPATIENT)
Dept: GASTROENTEROLOGY | Facility: CLINIC | Age: 65
End: 2025-08-29
Payer: COMMERCIAL

## 2025-08-29 VITALS
OXYGEN SATURATION: 100 % | WEIGHT: 207 LBS | HEIGHT: 72 IN | HEART RATE: 82 BPM | BODY MASS INDEX: 28.04 KG/M2 | SYSTOLIC BLOOD PRESSURE: 129 MMHG | DIASTOLIC BLOOD PRESSURE: 76 MMHG

## 2025-08-29 DIAGNOSIS — R94.8 ABNORMAL GASTROINTESTINAL POSITRON EMISSION TOMOGRAPHY (PET) SCAN: Primary | ICD-10-CM
